# Patient Record
Sex: FEMALE | Race: WHITE | NOT HISPANIC OR LATINO | Employment: OTHER | URBAN - METROPOLITAN AREA
[De-identification: names, ages, dates, MRNs, and addresses within clinical notes are randomized per-mention and may not be internally consistent; named-entity substitution may affect disease eponyms.]

---

## 2017-02-23 ENCOUNTER — GENERIC CONVERSION - ENCOUNTER (OUTPATIENT)
Dept: OTHER | Facility: OTHER | Age: 57
End: 2017-02-23

## 2017-03-31 ENCOUNTER — ALLSCRIPTS OFFICE VISIT (OUTPATIENT)
Dept: OTHER | Facility: OTHER | Age: 57
End: 2017-03-31

## 2017-08-16 ENCOUNTER — GENERIC CONVERSION - ENCOUNTER (OUTPATIENT)
Dept: OTHER | Facility: OTHER | Age: 57
End: 2017-08-16

## 2017-08-25 ENCOUNTER — GENERIC CONVERSION - ENCOUNTER (OUTPATIENT)
Dept: OTHER | Facility: OTHER | Age: 57
End: 2017-08-25

## 2017-10-13 ENCOUNTER — TRANSCRIBE ORDERS (OUTPATIENT)
Dept: ADMINISTRATIVE | Facility: HOSPITAL | Age: 57
End: 2017-10-13

## 2017-10-13 DIAGNOSIS — H69.83 DYSFUNCTION OF BOTH EUSTACHIAN TUBES: Primary | ICD-10-CM

## 2017-11-08 ENCOUNTER — HOSPITAL ENCOUNTER (OUTPATIENT)
Dept: CT IMAGING | Facility: HOSPITAL | Age: 57
Discharge: HOME | End: 2017-11-08
Attending: SPECIALIST
Payer: COMMERCIAL

## 2017-11-08 DIAGNOSIS — H69.83 DYSFUNCTION OF BOTH EUSTACHIAN TUBES: ICD-10-CM

## 2017-11-08 PROCEDURE — 70480 CT ORBIT/EAR/FOSSA W/O DYE: CPT

## 2017-11-20 ENCOUNTER — GENERIC CONVERSION - ENCOUNTER (OUTPATIENT)
Dept: OTHER | Facility: OTHER | Age: 57
End: 2017-11-20

## 2017-12-14 DIAGNOSIS — Z12.31 ENCOUNTER FOR SCREENING MAMMOGRAM FOR MALIGNANT NEOPLASM OF BREAST: ICD-10-CM

## 2017-12-15 ENCOUNTER — ALLSCRIPTS OFFICE VISIT (OUTPATIENT)
Dept: OTHER | Facility: OTHER | Age: 57
End: 2017-12-15

## 2017-12-16 NOTE — PROGRESS NOTES
Assessment  1  Encounter for routine gynecological examination () (Z01 419)    Plan  Encounter for screening mammogram for breast cancer    · MAMMO SCREENING BILATERAL W 3D & CAD; Status:Hold For - Scheduling; Requestedfor:34Hgo5924;    Perform:[facility]; Due:55Msk2515; Ordered;For:Encounter for screening mammogram for breast cancer; Ordered By:Karissa Crowley;  Insomnia    · Zolpidem Tartrate 5 MG Oral Tablet (Ambien); TAKE 1 TABLET AT  BEDTIME ASNEEDED FOR INSOMNIA   Rx By: Hattie Blackman; Dispense: 30 Days ; #:30 Tablet; Refill: 1;For: Insomnia; ANTONIO = N; Print Rx    Discussion/Summary  healthy adult female Currently, she eats an adequate diet  the risks and benefits of cervical cancer screening were discussed cervical cancer screening is current HPV and Pap Co-testing Done Today Breast cancer screening: monthly self breast exam was advised, mammogram is current, mammogram has been ordered and mammogram is needed every year  Colorectal cancer screening: colonoscopy is needed every ten years  Osteoporosis screening: bone mineral density testing is not indicated  Patient discussion: discussed with the patient  The patient has the current Goals: Well woman  The patent has the current Barriers: None  Patient is able to Self-Care  Chief Complaint  pt presents for a yearly exam      History of Present Illness  HPI: 58 yo  for annual exam; doing well; Menopausal - LMP 2013Not using Beth Rodney as much - using vallerian root, which is helping   GYN HM, Adult Female  [facility]: The patient is being seen for a gynecology evaluation  The last health maintenance visit was 2 year(s) ago  General Health: The patient's health since the last visit is described as good  Lifestyle:  She does not use tobacco -- She denies alcohol use  -- She denies drug use  Reproductive health: the patient is postmenopausal--   she reports no menstrual problems  -- she is sexually active  -- pregnancy history: G 3P 2  Screening: Cervical cancer screening includes a pap smear performed -- and-- human papilloma virus screening performed   Breast cancer screening includes a mammogram performed   Review of Systems  no pelvic pain,-- no pelvic pressure,-- no vaginal pain,-- no vaginal discharge,-- no vaginal itching,-- no vaginal lump or mass,-- no vaginal odor,-- no vulvar pain,-- no vulvar itching,-- no vulvar lump or mass,-- no postmenopausal bleeding,-- no dysuria-- and-- no urinary loss of control  Constitutional: No fever, no chills, feels well, no tiredness, no recent weight gain or loss  ENT: no ear ache, no loss of hearing, no nosebleeds or nasal discharge, no sore throat or hoarseness  Cardiovascular: no complaints of slow or fast heart rate, no chest pain, no palpitations, no leg claudication or lower extremity edema  Respiratory: no complaints of shortness of breath, no wheezing, no dyspnea on exertion, no orthopnea or PND  Breasts: no complaints of breast pain, breast lump or nipple discharge  Gastrointestinal: no complaints of abdominal pain, no constipation, no nausea or diarrhea, no vomiting, no bloody stools  Genitourinary: no complaints of dysuria, no incontinence, no pelvic pain, no dysmenorrhea, no vaginal discharge or abnormal vaginal bleeding  Musculoskeletal: no complaints of arthralgia, no myalgia, no joint swelling or stiffness, no limb pain or swelling  Integumentary: no complaints of skin rash or lesion, no itching or dry skin, no skin wounds  Neurological: no complaints of headache, no confusion, no numbness or tingling, no dizziness or fainting  ROS reviewed  OB History  Pregnancy History (Brief):  Prior pregnancies: : 3  Para: 2 (full-term)       Active Problems  1  Acute sinusitis (461 9) (J01 90)   2  Encounter for screening mammogram for breast cancer (V76 12) (Z12 31)   3   Insomnia (780 52) (G47 00)    Past Medical History   · History of Acute URI (465 9) (J06 9)   · History of Axillary swelling (729 81) (M79 89)   · History of Blood in stool (578 1) (K92 1)   · History of Cervical cancer screening (V76 2) (Z12 4)   · History of Chest pain (786 50) (R07 9)   · History of Depression screen (V79 0) (Z13 89)   · History of Dysuria (788 1) (R30 0)   · History of Encounter for screening mammogram for malignant neoplasm of breast(V76 12) (Z12 31)   · History of acute bacterial sinusitis (V12 69) (Z87 09)   · History of acute bronchitis (V12 69) (Z87 09)   · History of acute sinusitis (V12 69) (Z87 09)   · History of allergic rhinitis (V12 69) (Z87 09)   · History of bacterial conjunctivitis (V12 49) (Z86 69)   · History of esophageal reflux (V12 79) (Z87 19)   · History of Need for immunization against influenza (V04 81) (Z23)   · History of Need for prophylactic vaccination with measles-mumps-rubella (MMR) vaccine(V06 4) (Z23)   · History of PMB (postmenopausal bleeding) (627 1) (N95 0)   · History of Recurrent cold sores (054 9) (B00 1)   · History of Routine Gynecological Exam With Cervical Pap Smear (V72 31)   · History of Screening for cardiovascular condition (V81 2) (Z13 6)   · History of Screening for deficiency anemia (V78 1) (Z13 0)   · History of Screening for diabetes mellitus (V77 1) (Z13 1)   · History of Screening for thyroid disorder (V77 0) (Z13 29)   · History of Sensation of lump in throat (784 99) (R22 1)   · History of Sore throat (462) (J02 9)   · History of Urgency of urination (788 63) (R39 15)   · History of Urinary frequency (788 41) (R35 0)   · Urinary tract infection (599 0) (N39 0)    The active problems and past medical history were reviewed and updated today  Surgical History   · History of Colonoscopy (Fiberoptic) Screening   · History of Complete Colonoscopy   · History of Lymphadenectomy   · History of Tubal Ligation    The surgical history was reviewed and updated today         Family History  Mother    · Family history of Diabetes · Family history of Hypertension    The family history was reviewed and updated today  Social History   · Being A Social Drinker   · Never A Smoker  The social history was reviewed and updated today  The social history was reviewed and is unchanged  Current Meds   1  Azelastine HCl - 0 1 % Nasal Solution; INSERT 2 SQUIRTS IN EACH NOSTRIL TWICE DAILY; Therapy: 23VMD2469 to (Last Rx:13Oct2016)  Requested for: 13Oct2016 Ordered   2  Tobramycin 0 3 % Ophthalmic Solution; INSTILL 1 DROP INTO AFFECTED EYE(S) 4 TIMES DAILY; Therapy: 67Lxk0584 to (Last Rx:28Dec2016)  Requested for: 28Dec2016 Ordered   3  Zolpidem Tartrate 5 MG Oral Tablet; TAKE 1 TABLET AT  BEDTIME AS NEEDED FOR INSOMNIA; Therapy: 14Kcc2888 to (Evaluate:23Nov2016); Last Rx:55Xie9981 Ordered    Allergies  1  Sporanox CAPS   2  Sulfa Drugs    Vitals   Recorded: 20CDY6308 21:58IY   Systolic 501   Diastolic 60   Height 5 ft 6 in   Weight 153 lb    BMI Calculated 24 7   BSA Calculated 1 78     Physical Exam   Constitutional  General appearance: No acute distress, well appearing and well nourished  Neck  Neck: Normal, supple, trachea midline, no masses  Thyroid: Normal, no thyromegaly  Pulmonary  Respiratory effort: No increased work of breathing or signs of respiratory distress  Auscultation of lungs: Clear to auscultation  Cardiovascular  Auscultation of heart: Normal rate and rhythm, normal S1 and S2, no murmurs  Peripheral vascular exam: Normal pulses Throughout  Genitourinary  External genitalia: Normal and no lesions appreciated  Vagina: Normal, no lesions or dryness appreciated  Urethra: Normal    Urethral meatus: Normal    Bladder: Normal, soft, non-tender and no prolapse or masses appreciated  Cervix: Normal, no palpable masses  A Pap smear was performed  Uterus: Normal, non-tender, not enlarged, and no palpable masses  Adnexa/parametria: Normal, non-tender and no fullness or masses appreciated     Chest  Breasts: Normal and no dimpling or skin changes noted  Abdomen  Abdomen: Normal, non-tender, and no organomegaly noted  Lymphatic  Palpation of lymph nodes in neck, axillae, groin and/or other locations: No lymphadenopathy or masses noted  Skin  Skin and subcutaneous tissue: Normal skin turgor and no rashes     Palpation of skin and subcutaneous tissue: Normal    Psychiatric  Orientation to person, place, and time: Normal    Mood and affect: Normal        Signatures   Electronically signed by : MC Dennis ; Dec 15 2017  8:40AM EST                       (Author)

## 2017-12-18 ENCOUNTER — ALLSCRIPTS OFFICE VISIT (OUTPATIENT)
Dept: OTHER | Facility: OTHER | Age: 57
End: 2017-12-18

## 2017-12-19 ENCOUNTER — GENERIC CONVERSION - ENCOUNTER (OUTPATIENT)
Dept: OTHER | Facility: OTHER | Age: 57
End: 2017-12-19

## 2017-12-19 LAB
ADEQUACY: (HISTORICAL): NORMAL
CLINICIAN PROVIDIED ICD 9 OR 10 (HISTORICAL): NORMAL
COMMENT (HISTORICAL): NORMAL
DIAGNOSIS (HISTORICAL): NORMAL
PERFORMED BY (HISTORICAL): NORMAL
REFLEX (HISTORICAL): NORMAL
TEST INFORMATION (HISTORICAL): NORMAL

## 2017-12-28 ENCOUNTER — TRANSCRIBE ORDERS (OUTPATIENT)
Dept: ADMINISTRATIVE | Facility: HOSPITAL | Age: 57
End: 2017-12-28

## 2017-12-28 ENCOUNTER — GENERIC CONVERSION - ENCOUNTER (OUTPATIENT)
Dept: FAMILY MEDICINE CLINIC | Facility: CLINIC | Age: 57
End: 2017-12-28

## 2017-12-28 DIAGNOSIS — H69.83 DYSFUNCTION OF BOTH EUSTACHIAN TUBES: Primary | ICD-10-CM

## 2017-12-28 DIAGNOSIS — H70.93 MASTOIDITIS OF BOTH SIDES: ICD-10-CM

## 2018-01-10 NOTE — RESULT NOTES
Verified Results  (1) URINE CULTURE 22QML1959 03:10PM [employer] Order Number: [id number]     Test Name Result Flag Reference   CLINICAL REPORT (Report)     Test:        Urine culture  Specimen Type:   Urine  Specimen Date:   11/11/2016 3:10 PM  Result Date:    11/14/2016 1:49 PM  Result Status:   Final result  Resulting Lab:   [place] [address]            Tel: [phone]      CULTURE                                       ------------------                                   >100,000 cfu/ml Staphylococcus saprophyticus     *** Routine testing of urine isolates of S  saprophyticus is not recommended      per CLSI guidelines  S  saprophyticus acute, uncomplicated urinary tract infections respond to      concentrations achieved in urine of antimicrobial agents commonly used to      treat them  Antimicrobials commonly used to treat acute, uncomplicated UTI's are      nitrofurantoin, fluoroquinolones or trimethoprim with or without      sulfamethoxazole   ***

## 2018-01-12 NOTE — PROGRESS NOTES
Assessment    1  Encounter for preventive health examination (V70 0) (Z00 00)   2  Esophageal reflux (530 81) (K21 9)   3  Insomnia (780 52) (G47 00)   4  Axillary swelling (729 81) (M79 89)    Plan  Depression screen    · *VB-Depression Screening; Status:Complete;   Done: 19PMJ5915  Insomnia    · Zolpidem Tartrate 5 MG Oral Tablet (Ambien); TAKE 1 TABLET AT  BEDTIME AS  NEEDED FOR INSOMNIA   · Zolpidem Tartrate ER 6 25 MG Oral Tablet Extended Release; TAKE 1 TABLET AT  BEDTIME  PMH: Cervical cancer screening    · (1) THIN PREP PAP WITH IMAGING ; every 3 years; Last 41VLP6435; Next 64IBT0317;  Status:Active    Discussion/Summary  health maintenance visit Currently, she eats a healthy diet and has an adequate exercise regimen  cervical cancer screening is current pt follows with Dr Luis F Quiles at [address] [place] [address] OB/GYN Breast cancer screening: mammogram is current and due Jan 2017  Colorectal cancer screening: colorectal cancer screening is current and last done feb 28, 2009  will be due 2019  Screening lab work includes I reviewed patient's fasting blood work results from July 2016  Total cholesterol 193, HDL 69,   Fasting sugar 93 with normal kidney and liver function  Normal electrolytes  TSH 2  Normal CBC  I reassured patient no further treatment is necessary and these are excellent numbers  We'll repeat in one year  The Patient is up-to-date with her Adacel, last given July 2016 at work  She will be getting her influenza vaccination through her job  She was advised to be evaluated by an ophthalmologist and a dentist  Advice and education were given regarding nutrition, aerobic exercise, weight bearing exercise, vitamin D supplements, reproductive health and cardiovascular risk reduction  Patient discussion: discussed with the patient  Patient has a history of surgery to remove 3 enlarged lymph nodes in her left axilla dating back to 1996   I reviewed the surgery report from [facility] surgical pathology report with a diagnosis of nonspecific reactive hyperplasia in lymph node showing evidence of 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  Patient states that she was sure that the lymph nodes were benign and not related to cancer  She is up-to-date with all of her cancer screenings  She states that on occasion she will get some burning and aching has in her left axilla and sometimes the other lymph nodes that remained me intermittently increase in size and then go down to normal  I reassured patient that this could certainly be scar tissue and nerve damage secondary to the surgery  She states she was told by surgeon that she can return to have the others removed if necessary  I explained to patient that may be a good idea to follow-up with the surgeon to discuss having the others removed if they continue to cause her problems  Chief Complaint  patient presents for personal wellness physical, she reports being UTD with colonoscopy, pap smear and mammo who she see Dr Qamar Posada for  History of Present Illness  , Adult Female: The patient is being seen for a health maintenance evaluation  General Health: The patient's health since the last visit is described as good   Denies changes to health, no recent hospitalizations  States sometimes gets some burning in left axilla secondary to removal of 3 benign LN about 18 years ago  She has regular dental visits  She denies vision problems  Vision care includes wearing glasses and last eye exam: july 2016  She has hearing loss  (hearing loss B/L, hearing aids)  Lifestyle:  She consumes a diverse and healthy diet  She does not have any weight concerns  She exercises regularly  Exercise includes walking  She does not use tobacco  She consumes alcohol  (social ETOH)   She denies drug use  Reproductive health: the patient is postmenopausal   she reports normal menses  (LMP about 2 years ago  denies vaginal bleed since Postmenopause)    she is sexually active  She is monogamous with a male partner  pregnancy history: G 3P 2, 0, 1, 2(miscarriages: 1 )  Screening: Cervical cancer screening includes a pap smear performed nov 2015  Breast cancer screening includes a mammogram performed jan 2016  Colorectal cancer screening includes a colonoscopy performed approx 2009  Review of Systems    Constitutional: No fever, no chills, feels well, no tiredness, no recent weight gain or weight loss  Eyes: No complaints of eye pain, no red eyes, no eyesight problems, no discharge, no dry eyes, no itching of eyes  ENT: no complaints of earache, no loss of hearing, no nose bleeds, no nasal discharge, no sore throat, no hoarseness  Cardiovascular: No complaints of slow heart rate, no fast heart rate, no chest pain, no palpitations, no leg claudication, no lower extremity edema  Respiratory: No complaints of shortness of breath, no wheezing, no cough, no SOB on exertion, no orthopnea, no PND  Gastrointestinal: No complaints of abdominal pain, no constipation, no nausea or vomiting, no diarrhea, no bloody stools  Genitourinary: No complaints of dysuria, no incontinence, no pelvic pain, no dysmenorrhea, no vaginal discharge or bleeding  Musculoskeletal: No complaints of arthralgias, no myalgias, no joint swelling or stiffness, no limb pain or swelling  Integumentary: No complaints of skin rash or lesions, no itching, no skin wounds, no breast pain or lump  Neurological: No complaints of headache, no confusion, no convulsions, no numbness, no dizziness or fainting, no tingling, no limb weakness, no difficulty walking  Psychiatric: Not suicidal, no sleep disturbance, no anxiety or depression, no change in personality, no emotional problems  Endocrine: No complaints of proptosis, no hot flashes, no muscle weakness, no deepening of the voice, no feelings of weakness     Hematologic/Lymphatic: No complaints of swollen glands, no swollen glands in the neck, does not bleed easily, does not bruise easily  Over the past 2 weeks, how often have you been bothered by the following problems? 1 ) Little interest or pleasure in doing things? Not at all    2 ) Feeling down, depressed or hopeless? Not at all    3 ) Trouble falling asleep or sleeping too much? Several days  4 ) Feeling tired or having little energy? Not at all    5 ) Poor appetite or overeating? Not at all    6 ) Feeling bad about yourself, or that you are a failure, or have let yourself or your family down? Not at all    7 ) Trouble concentrating on things, such as reading a newspaper or watching television? Not at all    8 ) Moving or speaking so slowly that other people could have noticed, or the opposite, moving or speaking faster than usual? Not at all  How difficult have these problems made it for you to do your work, take care of things at home, or get along with people? Not at all  Score 1      Active Problems    1  Esophageal reflux (530 81) (K21 9)   2   PMB (postmenopausal bleeding) (627 1) (N95 0)    Past Medical History    · History of Blood in stool (578 1) (K92 1)   · History of Cervical cancer screening (V76 2) (Z12 4)   · History of Chest pain (786 50) (R07 9)   · History of Encounter for routine gynecological examination (V72 31) (Z01 419)   · History of Encounter for screening mammogram for malignant neoplasm of breast  (V76 12) (Z12 31)   · History of acute bronchitis (V12 69) (Z87 09)   · History of acute sinusitis (V12 69) (Z87 09)   · History of allergic rhinitis (V12 69) (Z87 09)   · History of Need for immunization against influenza (V04 81) (Z23)   · History of Need for prophylactic vaccination with measles-mumps-rubella (MMR) vaccine  (V06 4) (Z23)   · History of Recurrent cold sores (054 9) (B00 1)   · History of Routine Gynecological Exam With Cervical Pap Smear (V72 31)   · History of Screening for cardiovascular condition (V81 2) (Z13 6)   · History of Screening for deficiency anemia (V78 1) (Z13 0)   · History of Screening for diabetes mellitus (V77 1) (Z13 1)   · History of Screening for thyroid disorder (V77 0) (Z13 29)   · History of Sensation of lump in throat (784 99) (R22 1)   · History of Sore throat (462) (J02 9)   · History of Urgency of urination (788 63) (R39 15)   · History of Urinary frequency (788 41) (R35 0)   · Urinary tract infection (599 0) (N39 0)    Surgical History    · History of Colonoscopy (Fiberoptic) Screening   · History of Complete Colonoscopy   · History of Lymphadenectomy   · History of Tubal Ligation    Family History  Mother    · Family history of Diabetes   · Family history of Hypertension    Social History    · Being A Social Drinker   · Never A Smoker    Current Meds   1  NexIUM 24HR 20 MG Oral Capsule Delayed Release; TAKE 1 CAPSULE DAILY; Therapy: 48WAN5843 to (Evaluate:23Oct2015); Last Rx:09Oct2015 Ordered   2  Zolpidem Tartrate ER 6 25 MG Oral Tablet Extended Release; TAKE 1 TABLET AT   BEDTIME; Therapy: 15RBL6325 to (Evaluate:02Noa7299); Last Rx:24Nov2015 Ordered    Allergies    1  Sporanox CAPS   2  Sulfa Drugs    Vitals   Recorded: 24TFR4129 73:85VF   Systolic 223, LUE   Diastolic 68, LUE   Heart Rate 83   Temperature 98 2 F   Pain Scale 0   LMP menopause   O2 Saturation 99   Height 5 ft 6 in   Weight 158 lb    BMI Calculated 25 5   BSA Calculated 1 81     Physical Exam    Constitutional   General appearance: No acute distress, well appearing and well nourished  appears healthy, within normal limits of ideal weight and appearance reflects stated age  vitals reviewed  Head and Face   Head and face: Normal     Eyes   Conjunctiva and lids: No swelling, erythema or discharge  Pupils and irises: Equal, round, reactive to light  EOMI, PERRLA B/L  Ears, Nose, Mouth, and Throat   External inspection of ears and nose: Normal     Otoscopic examination: Tympanic membranes translucent with normal light reflex   Canals patent without erythema  Hearing: Normal   grossly normal B/L at 10ft  Nasal mucosa, septum, and turbinates: Normal without edema or erythema  Lips, teeth, and gums: Normal, good dentition  Oropharynx: Normal with no erythema, edema, exudate or lesions  Neck   Thyroid: Normal, no thyromegaly  Pulmonary   Respiratory effort: No increased work of breathing or signs of respiratory distress  Auscultation of lungs: Clear to auscultation  Cardiovascular   Auscultation of heart: Normal rate and rhythm, normal S1 and S2, no murmurs  Carotid pulses: 2+ bilaterally  right 2+, no bruit heard over the right carotid, left 2+ and no bruit heard over the left carotid  Pedal pulses: 2+ bilaterally  Examination of extremities for edema and/or varicosities: Normal     Abdomen   Abdomen: Non-tender, no masses  The abdomen was flat  Bowel sounds were normal  The abdomen was soft and nontender  Lymphatic   Palpation of lymph nodes in neck: No lymphadenopathy  Palpation of lymph nodes in axillae: Abnormal   2 palpable LN left axilla, slightly tender, pre-existing  Palpation of lymph nodes in groin: No lymphadenopathy  Musculoskeletal   Gait and station: Normal     Digits and nails: Normal without clubbing or cyanosis  Joints, bones, and muscles: Normal     Range of motion: Normal     Stability: Normal     Muscle strength/tone: Normal     Skin   Skin and subcutaneous tissue: Normal without rashes or lesions  Neurologic   Cranial nerves: Cranial nerves II-XII intact  Reflexes: 2+ and symmetric  Deep tendon reflexes: 2+ right brachioradialis, 2+ left brachioradialis, 2+ right patella and 2+ left patella  Coordination: Normal finger to nose and heel to shin  Psychiatric   Judgment and insight: Normal     Orientation to person, place, and time: Normal     Mood and affect: Normal        Signatures   Electronically signed by : FANNIE Ahumada;  Aug 25 2016  8:41AM EST                       (Author) Electronically signed by : Larissa Soliz DO; Aug 25 2016 12:21PM EST

## 2018-01-13 ENCOUNTER — HOSPITAL ENCOUNTER (OUTPATIENT)
Dept: CT IMAGING | Facility: HOSPITAL | Age: 58
Discharge: HOME | End: 2018-01-13
Attending: SPECIALIST
Payer: COMMERCIAL

## 2018-01-13 ENCOUNTER — GENERIC CONVERSION - ENCOUNTER (OUTPATIENT)
Dept: OTHER | Facility: OTHER | Age: 58
End: 2018-01-13

## 2018-01-13 VITALS
BODY MASS INDEX: 25.78 KG/M2 | DIASTOLIC BLOOD PRESSURE: 70 MMHG | SYSTOLIC BLOOD PRESSURE: 112 MMHG | HEART RATE: 94 BPM | WEIGHT: 160.44 LBS | TEMPERATURE: 99 F | HEIGHT: 66 IN | RESPIRATION RATE: 16 BRPM | OXYGEN SATURATION: 98 %

## 2018-01-13 DIAGNOSIS — H70.93 MASTOIDITIS OF BOTH SIDES: ICD-10-CM

## 2018-01-13 DIAGNOSIS — H69.83 DYSFUNCTION OF BOTH EUSTACHIAN TUBES: ICD-10-CM

## 2018-01-13 PROCEDURE — 70480 CT ORBIT/EAR/FOSSA W/O DYE: CPT

## 2018-01-14 NOTE — RESULT NOTES
Verified Results  (1) CBC/PLT/DIFF [id number] 07:40AM Enmanuel Almaguer     Test Name Result Flag Reference   WHITE BLOOD CELL COUNT 4 5 Thousand/uL  3 8-10 8   RED BLOOD CELL COUNT 3 95 Million/uL  3 80-5 10   HEMOGLOBIN 12 6 g/dL  11 7-15 5   HEMATOCRIT 37 9 %  35 0-45 0   MCV 96 1 fL  80 0-100 0   MCH 31 9 pg  27 0-33 0   MCHC 33 2 g/dL  32 0-36 0   RDW 13 4 %  11 0-15 0   PLATELET COUNT 360 Thousand/uL  140-400   MPV 9 7 fL  7 5-11 5   ABSOLUTE NEUTROPHILS 2678 cells/uL  5028-8860   ABSOLUTE LYMPHOCYTES 1319 cells/uL  850-3900   ABSOLUTE MONOCYTES 333 cells/uL  200-950   ABSOLUTE EOSINOPHILS 144 cells/uL     ABSOLUTE BASOPHILS 27 cells/uL  0-200   NEUTROPHILS 59 5 %     LYMPHOCYTES 29 3 %     MONOCYTES 7 4 %     EOSINOPHILS 3 2 %     BASOPHILS 0 6 %       (Q) COMPREHENSIVE METABOLIC PNL W/ADJUSTED CALCIUM [id number] 07:40AM Enmanuel Almaguer     Test Name Result Flag Reference   GLUCOSE 93 mg/dL  65-99   Fasting reference interval   UREA NITROGEN (BUN) 16 mg/dL  7-25   CREATININE 0 83 mg/dL  0 50-1 05   For patients >52years of age, the reference limit  for Creatinine is approximately 13% higher for people  identified as -American  eGFR NON-AFR   AMERICAN 78 mL/min/1 73m2  > OR = 60   eGFR AFRICAN AMERICAN 90 mL/min/1 73m2  > OR = 60   BUN/CREATININE RATIO   6-02   NOT APPLICABLE (calc)   SODIUM 137 mmol/L  135-146   POTASSIUM 4 5 mmol/L  3 5-5 3   CHLORIDE 102 mmol/L     CARBON DIOXIDE 26 mmol/L  19-30   CALCIUM 9 1 mg/dL  8 6-10 4   CALCIUM (ADJUSTED FOR$ALBUMIN) 9 1 mg/dL (calc)  8 6-10 2   PROTEIN, TOTAL 7 0 g/dL  6 1-8 1   ALBUMIN 4 4 g/dL  3 6-5 1   GLOBULIN 2 6 g/dL (calc)  1 9-3 7   ALBUMIN/GLOBULIN RATIO 1 7 (calc)  1 0-2 5   BILIRUBIN, TOTAL 0 5 mg/dL  0 2-1 2   ALKALINE PHOSPHATASE 75 U/L     AST 23 U/L  10-35   ALT 24 U/L  6-29     (Q) LIPID PANEL WITH DIRECT LDL [id number] 07:40AM Enmanuel Almaguer     Test Name Result Flag Reference   CHOLESTEROL, TOTAL 193 mg/dL  125-200 HDL CHOLESTEROL 69 mg/dL  > OR = 46   TRIGLICERIDES 56 mg/dL  <141   DIRECT  mg/dL  <130   Desirable range <100 mg/dL for patients with CHD or  diabetes and <70 mg/dL for diabetic patients with  known heart disease  CHOL/HDLC RATIO 2 8 (calc)  < OR = 5 0   NON HDL CHOLESTEROL 124 mg/dL (calc)     Target for non-HDL cholesterol is 30 mg/dL higher than   LDL cholesterol target       (Q) TSH, 3RD GENERATION [id number] 07:40AM Sarath Briggs   REPORT COMMENT:  FASTING:YES     Test Name Result Flag Reference   TSH 2 07 mIU/L  0 40-4 50

## 2018-01-18 ENCOUNTER — GENERIC CONVERSION - ENCOUNTER (OUTPATIENT)
Dept: FAMILY MEDICINE CLINIC | Facility: CLINIC | Age: 58
End: 2018-01-18

## 2018-01-22 VITALS
WEIGHT: 153 LBS | DIASTOLIC BLOOD PRESSURE: 60 MMHG | BODY MASS INDEX: 24.59 KG/M2 | HEIGHT: 66 IN | SYSTOLIC BLOOD PRESSURE: 100 MMHG

## 2018-01-23 VITALS
RESPIRATION RATE: 17 BRPM | SYSTOLIC BLOOD PRESSURE: 118 MMHG | HEART RATE: 76 BPM | TEMPERATURE: 98.2 F | OXYGEN SATURATION: 98 % | BODY MASS INDEX: 25.68 KG/M2 | DIASTOLIC BLOOD PRESSURE: 76 MMHG | WEIGHT: 159.13 LBS

## 2018-01-23 NOTE — RESULT NOTES
Verified Results  ([facility]) Pap IG, rfx HPV ASCU,16/18 [id number] 12:00AM Karissa Crowley   No  of containers  Colleenzabrina Maldonadokitty 01 ThinPrep Vial     Test Name Result Flag Reference   DIAGNOSIS:      NEGATIVE FOR INTRAEPITHELIAL LESION AND MALIGNANCY  NEGATIVE FOR INTRAEPITHELIAL LESION AND MALIGNANCY  Specimen adequacy:      Satisfactory for evaluation  Endocervical and/or squamous metaplastic  cells (endocervical component) are present  Satisfactory for evaluation  Endocervical and/or squamous metaplastic  cells (endocervical component) are present  Clinician provided ICD10: C57 328     Z01 419   Performed by: Eleonora Whitehead, Cytotechnologist (ASCP)   Eleonora Whitehead Cytotechnologist (ASCP)   Comm   Note: (Report)     The Pap smear is a screening test designed to aid in the detection of  premalignant and malignant conditions of the uterine cervix  It is not a  diagnostic procedure and should not be used as the sole means of detecting  cervical cancer  Both false-positive and false-negative reports do occur  The Pap smear is a screening test designed to aid in the detection of  premalignant and malignant conditions of the uterine cervix  It is not a  diagnostic procedure and should not be used as the sole means of detecting  cervical cancer  Both false-positive and false-negative reports do occur  Test Methodology: This liquid based ThinPrep(R) pap test was screened with the  use of an image guided system  This liquid based ThinPrep(R) pap test was screened with the  use of an image guided system  Reflex      The HPV DNA reflex criteria were not met with this specimen result  therefore, no HPV testing was performed  The HPV DNA reflex criteria were not met with this specimen result  therefore, no HPV testing was performed

## 2018-02-27 RX ORDER — MONTELUKAST SODIUM 10 MG/1
1 TABLET ORAL DAILY
COMMUNITY
Start: 2016-12-27 | End: 2018-03-02

## 2018-02-27 RX ORDER — TOBRAMYCIN 3 MG/ML
1 SOLUTION/ DROPS OPHTHALMIC 4 TIMES DAILY
COMMUNITY
Start: 2016-12-28 | End: 2018-03-02

## 2018-02-27 RX ORDER — ZOLPIDEM TARTRATE 5 MG/1
1 TABLET ORAL
COMMUNITY
Start: 2016-08-25 | End: 2018-10-18 | Stop reason: SDUPTHER

## 2018-02-27 RX ORDER — METHYLPREDNISOLONE 4 MG/1
TABLET ORAL
COMMUNITY
Start: 2017-12-18 | End: 2018-03-02

## 2018-02-27 RX ORDER — AZELASTINE HYDROCHLORIDE, FLUTICASONE PROPIONATE 137; 50 UG/1; UG/1
2 SPRAY, METERED NASAL 2 TIMES DAILY
COMMUNITY
Start: 2014-12-03 | End: 2019-08-21 | Stop reason: SDUPTHER

## 2018-03-02 ENCOUNTER — OFFICE VISIT (OUTPATIENT)
Dept: FAMILY MEDICINE CLINIC | Facility: CLINIC | Age: 58
End: 2018-03-02
Payer: COMMERCIAL

## 2018-03-02 VITALS
DIASTOLIC BLOOD PRESSURE: 62 MMHG | WEIGHT: 155.8 LBS | BODY MASS INDEX: 25.04 KG/M2 | TEMPERATURE: 98.7 F | HEIGHT: 66 IN | HEART RATE: 78 BPM | RESPIRATION RATE: 16 BRPM | OXYGEN SATURATION: 97 % | SYSTOLIC BLOOD PRESSURE: 96 MMHG

## 2018-03-02 DIAGNOSIS — Z01.818 PREOP EXAMINATION: Primary | ICD-10-CM

## 2018-03-02 DIAGNOSIS — H71.91 CHOLESTEATOMA OF RIGHT EAR: ICD-10-CM

## 2018-03-02 DIAGNOSIS — G47.09 OTHER INSOMNIA: ICD-10-CM

## 2018-03-02 PROCEDURE — 99242 OFF/OP CONSLTJ NEW/EST SF 20: CPT | Performed by: PHYSICIAN ASSISTANT

## 2018-03-02 NOTE — PROGRESS NOTES
Assessment/Plan:    Cholesteatoma of right ear  Surgery scheduled 3/9/18 by Dr Aleena Zuñiga at [facility]  Insomnia  Stable on prn ambien       Diagnoses and all orders for this visit:    Preop examination    Cholesteatoma of right ear    Other insomnia    Other orders  -     Azelastine-Fluticasone 137-50 MCG/ACT SUSP; 2 Squirts into each nostril 2 (two) times a day  -     Discontinue: montelukast (SINGULAIR) 10 mg tablet; Take 1 tablet by mouth daily  -     Discontinue: Methylprednisolone (MEDROL) 4 MG TBPK; Take by mouth  -     Discontinue: tobramycin (TOBREX) 0 3 % SOLN; Apply 1 drop to eye 4 (four) times a day  -     zolpidem (AMBIEN) 5 mg tablet; Take 1 tablet by mouth     Patient is a 57-year-old female presenting today for preoperative clearance for right ear surgery to remove a right cholesteatoma  This surgery  Is planned for 3/9/18 and will be performed by Dr Gunner Burch  Patient's exam is unremarkable today and she is asymptomatic  Her vitals are normal   Her preoperative blood work was reviewed and also unremarkable  Chronic conditions only to include intermittent insomnia for which she takes p r n  Ambien  She also has been dealing with chronic sinus and eustachian tube issues and takes as needed as a last Vincenzo nasal spray  Patient is considered in general good health and there are no obvious abnormalities that would complicate surgery  At this point patient is considered low risk for surgical complication and is cleared for surgery as planned  Subjective:      Patient ID: Sandra Colindres is a 59 y o  female     57y/o female here today for preop clearance  Surgery is scheduled March 9, 2018  She is having surgery to remove right cholesteatoma  Surgeon is Dr Aleena Zuñiga associated with [facility]  Prior hx of surgeries withut post-op complication  She has hx of nausea with anesthesia      She has not had any changes to health, no recent hospitalizations or illnesses  Denies Cp, SOB, cough, hemoptysis, edema, easy bleeding or easy bruising  Postmenopausal  Pt has no complaints today  Takes ambien prn insomnia  Also dealing with chronic sinus issues and eustachian tube dysfunction  The following portions of the patient's history were reviewed and updated as appropriate: allergies, current medications, past family history, past medical history, past social history, past surgical history and problem list     Review of Systems   Constitutional: Negative  HENT:        Hearing issues, tinnitus   Eyes: Negative  Respiratory: Negative  Cardiovascular: Negative  Gastrointestinal: Negative  Endocrine: Negative  Genitourinary: Negative  Musculoskeletal: Negative  Skin: Negative  Allergic/Immunologic: Negative  Neurological: Negative  Hematological: Negative  Psychiatric/Behavioral: Negative  Objective:      BP 96/62 (BP Location: Left arm, Patient Position: Sitting, Cuff Size: Standard)   Pulse 78   Temp 98 7 °F (37 1 °C) (Tympanic)   Resp 16   Ht 5' 6" (1 676 m)   Wt 70 7 kg (155 lb 12 8 oz)   SpO2 97%   BMI 25 15 kg/m²          Physical Exam   Constitutional: She is oriented to person, place, and time  Vital signs are normal  She appears well-developed and well-nourished  She does not appear ill  No distress  HENT:   Head: Normocephalic  Wears hearing aids   Eyes: Conjunctivae, EOM and lids are normal  Pupils are equal, round, and reactive to light  Neck: Trachea normal and normal range of motion  Neck supple  Normal carotid pulses present  No thyroid mass present  Cardiovascular: Normal rate, regular rhythm, S1 normal, S2 normal and normal pulses  No murmur heard  Pulmonary/Chest: Effort normal and breath sounds normal    Abdominal: Soft  Normal appearance, normal aorta and bowel sounds are normal  There is no tenderness     Musculoskeletal:   Gait normal    Lymphadenopathy:     She has no cervical adenopathy  Neurological: She is alert and oriented to person, place, and time  No cranial nerve deficit or sensory deficit  Reflex Scores:       Brachioradialis reflexes are 1+ on the right side and 1+ on the left side  Patellar reflexes are 1+ on the right side and 1+ on the left side  Skin: Skin is intact  Psychiatric: She has a normal mood and affect   Her behavior is normal  Cognition and memory are normal        Vitals:    03/02/18 0810   BP: 96/62   BP Location: Left arm   Patient Position: Sitting   Cuff Size: Standard   Pulse: 78   Resp: 16   Temp: 98 7 °F (37 1 °C)   TempSrc: Tympanic   SpO2: 97%   Weight: 70 7 kg (155 lb 12 8 oz)   Height: 5' 6" (1 676 m)

## 2018-06-01 ENCOUNTER — TELEPHONE (OUTPATIENT)
Dept: FAMILY MEDICINE CLINIC | Facility: CLINIC | Age: 58
End: 2018-06-01

## 2018-06-03 DIAGNOSIS — Z23 NEED FOR VACCINATION: Primary | ICD-10-CM

## 2018-06-03 NOTE — TELEPHONE ENCOUNTER
Order sent to [facility]   It is a 2 vaccine series, given 6 mos apart

## 2018-06-22 ENCOUNTER — TELEPHONE (OUTPATIENT)
Dept: FAMILY MEDICINE CLINIC | Facility: CLINIC | Age: 58
End: 2018-06-22

## 2018-06-23 DIAGNOSIS — Z23 NEED FOR VACCINATION: Primary | ICD-10-CM

## 2018-06-25 ENCOUNTER — TRANSCRIBE ORDERS (OUTPATIENT)
Dept: ADMINISTRATIVE | Facility: HOSPITAL | Age: 58
End: 2018-06-25

## 2018-06-25 DIAGNOSIS — H70.93 MASTOIDITIS OF BOTH SIDES: Primary | ICD-10-CM

## 2018-08-07 ENCOUNTER — HOSPITAL ENCOUNTER (OUTPATIENT)
Dept: CT IMAGING | Facility: HOSPITAL | Age: 58
Discharge: HOME | End: 2018-08-07
Attending: SPECIALIST
Payer: COMMERCIAL

## 2018-08-07 DIAGNOSIS — H70.93 MASTOIDITIS OF BOTH SIDES: ICD-10-CM

## 2018-08-07 PROCEDURE — 70480 CT ORBIT/EAR/FOSSA W/O DYE: CPT

## 2018-08-09 ENCOUNTER — OFFICE VISIT (OUTPATIENT)
Dept: FAMILY MEDICINE CLINIC | Facility: CLINIC | Age: 58
End: 2018-08-09
Payer: COMMERCIAL

## 2018-08-09 VITALS
WEIGHT: 161.06 LBS | OXYGEN SATURATION: 98 % | HEIGHT: 66 IN | HEART RATE: 81 BPM | TEMPERATURE: 98.3 F | BODY MASS INDEX: 25.89 KG/M2 | SYSTOLIC BLOOD PRESSURE: 110 MMHG | DIASTOLIC BLOOD PRESSURE: 60 MMHG | RESPIRATION RATE: 17 BRPM

## 2018-08-09 DIAGNOSIS — J01.90 ACUTE NON-RECURRENT SINUSITIS, UNSPECIFIED LOCATION: Primary | ICD-10-CM

## 2018-08-09 PROCEDURE — 99213 OFFICE O/P EST LOW 20 MIN: CPT | Performed by: PHYSICIAN ASSISTANT

## 2018-08-09 RX ORDER — VALACYCLOVIR HYDROCHLORIDE 1 G/1
1 TABLET, FILM COATED ORAL AS NEEDED
COMMUNITY

## 2018-08-09 RX ORDER — AMOXICILLIN AND CLAVULANATE POTASSIUM 875; 125 MG/1; MG/1
1 TABLET, FILM COATED ORAL EVERY 12 HOURS SCHEDULED
Qty: 20 TABLET | Refills: 0 | Status: SHIPPED | OUTPATIENT
Start: 2018-08-09 | End: 2018-08-19

## 2018-08-09 RX ORDER — EPINEPHRINE 0.3 MG/.3ML
INJECTION SUBCUTANEOUS AS NEEDED
COMMUNITY
End: 2020-12-15 | Stop reason: SDUPTHER

## 2018-08-09 RX ORDER — AZELASTINE HYDROCHLORIDE 0.5 MG/ML
SOLUTION/ DROPS OPHTHALMIC DAILY
COMMUNITY
End: 2019-08-21 | Stop reason: SDUPTHER

## 2018-08-09 NOTE — PROGRESS NOTES
Assessment/Plan:      Diagnoses and all orders for this visit:    Acute non-recurrent sinusitis, unspecified location  -     amoxicillin-clavulanate (AUGMENTIN) 875-125 mg per tablet; Take 1 tablet by mouth every 12 (twelve) hours for 10 days    Other orders  -     azelastine (OPTIVAR) 0 05 % ophthalmic solution; Administer to both eyes daily  -     Esomeprazole Magnesium (NEXIUM PO); Take 1 tablet by mouth daily as needed  -     Calcium Carb-Cholecalciferol (CALCIUM 500 +D PO); Take 1 tablet by mouth daily  -     Tuberculin-Allergy Syringes (B-D ALLERGY SYRINGE 1CC/28G) 28G X 1/2" 1 ML MISC; every 14 (fourteen) days        68-year-old female presenting today for upper respiratory symptoms consistent with sinusitis  She will complete a 10 day course of Augmentin  She will continue antihistamine decongestant combination in addition to her as a last seen nasal spray  She can use ibuprofen, Motrin, Aleve or Advil for headache  Rest, fluids, warm steam all advised  She is to call or follow-up should symptoms persist or worsen despite treatment  If she continues with some sinus pressure and headache I may consider adding a steroid taper  Chief Complaint   Patient presents with    Cold Like Symptoms     cough, congestion, PND, HA,ST and sinus pressure x 9 days  Has taken Allegra D with little relief       Subjective:     Patient ID: Wilian Courser is a 59 y o  female  55y/o female here today for ongoing sinus sxs past 8-9 days  She reports PND and cough as well as headache and sinus pressure  Also green mucous she is blowing out  No fevers  She tried allegra D and advil  Using azelastine nasal spray  Review of Systems   Constitutional: Positive for fatigue  Negative for fever  HENT: Positive for congestion, ear pain and sinus pressure  Negative for sore throat  Respiratory: Positive for cough  Cardiovascular: Negative  Neurological: Negative  Psychiatric/Behavioral: Negative            The following portions of the patient's history were reviewed and updated as appropriate: allergies, current medications, past family history, past medical history, past social history, past surgical history and problem list       Objective:     Physical Exam   Constitutional: She is oriented to person, place, and time  She appears well-developed and well-nourished  No distress  HENT:   Head: Normocephalic  Left Ear: Tympanic membrane and ear canal normal    Nose: Mucosal edema and rhinorrhea present  Tube right TM  PND   Neck: Neck supple  Cardiovascular: Normal rate, regular rhythm and normal heart sounds  Pulmonary/Chest: Effort normal and breath sounds normal    Lymphadenopathy:     She has no cervical adenopathy  Neurological: She is alert and oriented to person, place, and time  Psychiatric: She has a normal mood and affect  Vitals reviewed        Vitals:    08/09/18 1119   BP: 110/60   BP Location: Left arm   Patient Position: Sitting   Cuff Size: Adult   Pulse: 81   Resp: 17   Temp: 98 3 °F (36 8 °C)   TempSrc: Tympanic   SpO2: 98%   Weight: 73 1 kg (161 lb 1 oz)   Height: 5' 6 14" (1 68 m)

## 2018-09-25 ENCOUNTER — TELEPHONE (OUTPATIENT)
Dept: FAMILY MEDICINE CLINIC | Facility: CLINIC | Age: 58
End: 2018-09-25

## 2018-09-25 NOTE — TELEPHONE ENCOUNTER
COURTNEY Melgoza from [facility] called to let you know the pt was admitted to [facility] today  He can not disclose the reason   If you have any questions you can call [phone]

## 2018-09-25 NOTE — TELEPHONE ENCOUNTER
I tried calling the number you gave and spoke with one of the staff at neurosurg who put me through to resident  Resident said she is not familiar with that patient  I was not able to get any info about what is going on  I asked to speak to freda at neuro surg number he gave and they said they do not have a freda on team there

## 2018-09-25 NOTE — TELEPHONE ENCOUNTER
Called [phone] and spoke with Anna Valdes, she stated she just talked with someone and explained she didn't know who Bib Ramos was  Informed her I was calling back for the Pt's Dr to try and get more information for her, the pt's dr does have questions and would like to talk with someone  Anna Valdes transferred me to another number and I spoke with Amy Reyes said she is not real familiar with this pat as she is not assigned to her  She was able to confirm the pt was admitted today for a surgery and is in Neurosurgery ICU  [place] stated the procedure was performed this morning  Asked her for a direct name and number for the Dr to reach someone for an update and to answer questions  Informed her we weren't seeing much in Care everywhere and the pt has signed off for us to see this information  Amy took our phone number and Lady's name, she stated she is going to see what she can find out and get someone assigned to the pt to call Britany Earl and discuss further  I did let her know Britany Earl would like to be pulled out of the room when someone calls, it is important

## 2018-10-02 ENCOUNTER — TRANSITIONAL CARE MANAGEMENT (OUTPATIENT)
Dept: FAMILY MEDICINE CLINIC | Facility: CLINIC | Age: 58
End: 2018-10-02

## 2018-10-18 ENCOUNTER — ANNUAL EXAM (OUTPATIENT)
Dept: OBGYN CLINIC | Facility: MEDICAL CENTER | Age: 58
End: 2018-10-18
Payer: COMMERCIAL

## 2018-10-18 VITALS — DIASTOLIC BLOOD PRESSURE: 72 MMHG | WEIGHT: 160 LBS | BODY MASS INDEX: 25.71 KG/M2 | SYSTOLIC BLOOD PRESSURE: 120 MMHG

## 2018-10-18 DIAGNOSIS — R10.32 LLQ PAIN: ICD-10-CM

## 2018-10-18 DIAGNOSIS — Z01.419 ENCOUNTER FOR WELL WOMAN EXAM WITH ROUTINE GYNECOLOGICAL EXAM: Primary | ICD-10-CM

## 2018-10-18 DIAGNOSIS — G47.00 INSOMNIA, UNSPECIFIED TYPE: ICD-10-CM

## 2018-10-18 DIAGNOSIS — Z12.39 SCREENING FOR MALIGNANT NEOPLASM OF BREAST: ICD-10-CM

## 2018-10-18 DIAGNOSIS — Z12.4 ENCOUNTER FOR PAPANICOLAOU SMEAR FOR CERVICAL CANCER SCREENING: ICD-10-CM

## 2018-10-18 PROCEDURE — 99396 PREV VISIT EST AGE 40-64: CPT | Performed by: OBSTETRICS & GYNECOLOGY

## 2018-10-18 RX ORDER — ZOLPIDEM TARTRATE 5 MG/1
5 TABLET ORAL
Qty: 30 TABLET | Refills: 5 | Status: SHIPPED | OUTPATIENT
Start: 2018-10-18 | End: 2019-10-22 | Stop reason: SDUPTHER

## 2018-10-18 NOTE — PROGRESS NOTES
ASSESSMENT & PLAN: Zurdo Pierce is a [address] y o  No obstetric history on file  with normal gynecologic exam     1   Routine well woman exam done today  2  Pap and HPV:  The patient's last pap and hpv was 2015  It was normal     Pap and cotesting was done today  Current ASCCP Guidelines reviewed  Per patient's request  3  Mammogram ordered  4  Colonoscopy - scheduled December, 2018  5  The following were reviewed in today's visit: breast self exam and mammography screening ordered  6  LLQ pain, US ordered     CC:  Annual Gynecologic Examination    HPI: Zurdo Pierce is a [address] y o  No obstetric history on file  who presents for annual gynecologic examination  She has the following concerns:  Complains of occasional LLQ pain; intermittent, nonradiating; not assocaited with bowel or bladder habits; no bleeding/discharge/odor    Health Maintenance:    She wears her seatbelt routinely  She does perform regular monthly self breast exams  She feels safe at home  Pap: 2015  Last mammogram:  2018  Last colonoscopy: due this ear    Past Medical History:   Diagnosis Date    Allergic rhinitis     LAST ASSESSED [id number]    Chest pain     LAST ASSESSED [id number]    Dysuria     LAST ASSESSED [id number]    Esophageal reflux     LAST ASSESSED [id number]    PMB (postmenopausal bleeding)     RESOLVED [id number]    Recurrent cold sores     LAST ASSESSED [id number]       Past Surgical History:   Procedure Laterality Date    CHOLESTEATOMA EXCISION  03/2018    COLONOSCOPY  2009    COLONOSCOPY      LYMPHADENECTOMY      TUBAL LIGATION         Past OB/Gyn History:  OB History     No data available        Pt does not have menstrual issues      History of sexually transmitted infection: No   History of abnormal pap smears: No      Family History   Problem Relation Age of Onset    Diabetes Mother     Hypertension Mother     Stroke Mother     Arthritis Father     Hearing loss Father     Alcohol abuse Neg Hx     Substance Abuse Neg Hx     Mental illness Neg Hx     Depression Neg Hx        Social History:  Social History     Social History    Marital status: /Civil Union     Spouse name: N/A    Number of children: N/A    Years of education: N/A     Occupational History    Not on file  Social History Main Topics    Smoking status: Never Smoker    Smokeless tobacco: Never Used    Alcohol use Yes      Comment: SOCIAL     Drug use: No    Sexual activity: Yes     Partners: Male     Other Topics Concern    Not on file     Social History Narrative    No narrative on file       Allergies   Allergen Reactions    Itraconazole Rash    Sulfa Antibiotics Swelling       Current Outpatient Prescriptions:     azelastine (OPTIVAR) 0 05 % ophthalmic solution, Administer to both eyes daily, Disp: , Rfl:     Azelastine-Fluticasone 137-50 MCG/ACT SUSP, 2 Squirts into each nostril 2 (two) times a day, Disp: , Rfl:     Calcium Carb-Cholecalciferol (CALCIUM 500 +D PO), Take 1 tablet by mouth daily, Disp: , Rfl:     Ergocalciferol (VITAMIN D2 PO), Take 1 tablet by mouth daily  , Disp: , Rfl:     Esomeprazole Magnesium (NEXIUM PO), Take 1 tablet by mouth daily as needed, Disp: , Rfl:     Tuberculin-Allergy Syringes (B-D ALLERGY SYRINGE 1CC/28G) 28G X 1/2" 1 ML MISC, every 14 (fourteen) days, Disp: , Rfl:     valACYclovir (VALTREX) 1,000 mg tablet, Take 1 tablet by mouth daily, Disp: , Rfl:     zolpidem (AMBIEN) 5 mg tablet, Take 1 tablet (5 mg total) by mouth daily at bedtime as needed for sleep, Disp: 30 tablet, Rfl: 5    EPINEPHrine (EPIPEN 2-HERBERT) 0 3 mg/0 3 mL SOAJ, Inject into a muscle as needed, Disp: , Rfl:       Review of Systems  Constitutional :no fever, feels well, no tiredness, no recent weight gain or loss  ENT: no ear ache, no loss of hearing, no nosebleeds or nasal discharge, no sore throat or hoarseness    Cardiovascular: no complaints of slow or fast heart beat, no chest pain, no palpitations, no leg claudication or lower extremity edema  Respiratory: no complaints of shortness of shortness of breath, no WADE  Breasts:no complaints of breast pain, breast lump, or nipple discharge  Gastrointestinal: no complaints of abdominal pain, constipation, nausea, vomiting, or diarrhea or bloody stools  Genitourinary : no complaints of dysuria, incontinence, pelvic pain, no dysmenorrhea, vaginal discharge or abnormal vaginal bleeding and as noted in HPI  Musculoskeletal: no complaints of arthralgia, no myalgia, no joint swelling or stiffness, no limb pain or swelling  Integumentary: no complaints of skin rash or lesion, itching or dry skin  Neurological: no complaints of headache, no confusion, no numbness or tingling, no dizziness or fainting    Objective      /72   Wt 72 6 kg (160 lb)   BMI 25 71 kg/m²     General:   appears stated age, cooperative, alert normal mood and affect   Neck: normal, supple,trachea midline, no masses   Heart: regular rate and rhythm, S1, S2 normal, no murmur, click, rub or gallop   Lungs: clear to auscultation bilaterally   Breasts: normal appearance, no masses or tenderness, Inspection negative, No nipple retraction or dimpling, No nipple discharge or bleeding, No axillary or supraclavicular adenopathy, Normal to palpation without dominant masses   Abdomen: soft, non-tender, without masses or organomegaly   Vulva: normal female genitalia, Bartholin's, Urethra, [place] normal, no lesions, normal female hair distribution   Vagina: normal vagina, no discharge, exudate, lesion, or erythema   Urethra: normal   Cervix: Normal, no discharge  PAP done  Uterus: normal size, contour, position, consistency, mobility, non-tender   Adnexa: normal adnexa   Lymphatic palpation of lymph nodes in neck, axilla, groin and/or other locations: no lymphadenopathy or masses noted   Skin normal skin turgor and no rashes     Psychiatric orientation to person, place, and time: normal  mood and affect: normal

## 2018-10-19 ENCOUNTER — HOSPITAL ENCOUNTER (OUTPATIENT)
Dept: ULTRASOUND IMAGING | Facility: MEDICAL CENTER | Age: 58
Discharge: HOME | End: 2018-10-19
Payer: COMMERCIAL

## 2018-10-19 DIAGNOSIS — R10.32 LLQ PAIN: ICD-10-CM

## 2018-10-19 PROCEDURE — 76856 US EXAM PELVIC COMPLETE: CPT

## 2018-10-19 PROCEDURE — 76830 TRANSVAGINAL US NON-OB: CPT

## 2018-10-23 LAB
CYTOLOGIST CVX/VAG CYTO: NORMAL
DX ICD CODE: NORMAL
Lab: NORMAL
OTHER STN SPEC: NORMAL
OTHER STN SPEC: NORMAL
PATH REPORT.FINAL DX SPEC: NORMAL
SL AMB NOTE:: NORMAL
SL AMB SPECIMEN ADEQUACY: NORMAL
SL AMB TEST METHODOLOGY: NORMAL

## 2018-10-25 ENCOUNTER — CLINICAL SUPPORT (OUTPATIENT)
Dept: FAMILY MEDICINE CLINIC | Facility: CLINIC | Age: 58
End: 2018-10-25
Payer: COMMERCIAL

## 2018-10-25 DIAGNOSIS — Z23 NEED FOR SHINGLES VACCINE: Primary | ICD-10-CM

## 2018-10-25 PROCEDURE — 90750 HZV VACC RECOMBINANT IM: CPT | Performed by: PHYSICIAN ASSISTANT

## 2018-10-25 PROCEDURE — 90471 IMMUNIZATION ADMIN: CPT | Performed by: PHYSICIAN ASSISTANT

## 2018-12-06 ENCOUNTER — ANESTHESIA EVENT (OUTPATIENT)
Dept: GASTROENTEROLOGY | Facility: HOSPITAL | Age: 58
End: 2018-12-06
Payer: COMMERCIAL

## 2018-12-07 ENCOUNTER — HOSPITAL ENCOUNTER (OUTPATIENT)
Facility: HOSPITAL | Age: 58
Setting detail: OUTPATIENT SURGERY
Discharge: HOME | End: 2018-12-07
Attending: INTERNAL MEDICINE | Admitting: INTERNAL MEDICINE
Payer: COMMERCIAL

## 2018-12-07 ENCOUNTER — ANESTHESIA (OUTPATIENT)
Dept: GASTROENTEROLOGY | Facility: HOSPITAL | Age: 58
End: 2018-12-07
Payer: COMMERCIAL

## 2018-12-07 VITALS
BODY MASS INDEX: 25.71 KG/M2 | SYSTOLIC BLOOD PRESSURE: 101 MMHG | HEIGHT: 66 IN | RESPIRATION RATE: 16 BRPM | WEIGHT: 160 LBS | OXYGEN SATURATION: 100 % | HEART RATE: 73 BPM | DIASTOLIC BLOOD PRESSURE: 62 MMHG | TEMPERATURE: 98.6 F

## 2018-12-07 RX ORDER — SODIUM CHLORIDE 9 MG/ML
125 INJECTION, SOLUTION INTRAVENOUS CONTINUOUS
Status: DISCONTINUED | OUTPATIENT
Start: 2018-12-07 | End: 2018-12-07 | Stop reason: HOSPADM

## 2018-12-07 RX ORDER — DIPHENOXYLATE HYDROCHLORIDE AND ATROPINE SULFATE 2.5; .025 MG/1; MG/1
1 TABLET ORAL DAILY
COMMUNITY

## 2018-12-07 RX ORDER — PROPOFOL 10 MG/ML
INJECTION, EMULSION INTRAVENOUS AS NEEDED
Status: DISCONTINUED | OUTPATIENT
Start: 2018-12-07 | End: 2018-12-07 | Stop reason: SURG

## 2018-12-07 RX ORDER — ONDANSETRON 2 MG/ML
4 INJECTION INTRAMUSCULAR; INTRAVENOUS ONCE
Status: COMPLETED | OUTPATIENT
Start: 2018-12-07 | End: 2018-12-07

## 2018-12-07 RX ADMIN — PROPOFOL 100 MG: 10 INJECTION, EMULSION INTRAVENOUS at 07:36

## 2018-12-07 RX ADMIN — SODIUM CHLORIDE 125 ML/HR: 0.9 INJECTION, SOLUTION INTRAVENOUS at 07:10

## 2018-12-07 RX ADMIN — PROPOFOL 100 MG: 10 INJECTION, EMULSION INTRAVENOUS at 07:32

## 2018-12-07 RX ADMIN — ONDANSETRON 4 MG: 2 INJECTION INTRAMUSCULAR; INTRAVENOUS at 07:10

## 2018-12-07 RX ADMIN — PROPOFOL 100 MG: 10 INJECTION, EMULSION INTRAVENOUS at 07:42

## 2018-12-07 NOTE — OP NOTE
OPERATIVE REPORT  PATIENT NAME: Mingo Roa    :  1960  MRN: [id number]  Pt Location: [facility] ROOM 01    SURGERY DATE: 2018    Surgeon(s) and Role:     Mars Guerrero MD - Primary    Preop Diagnosis:  Encounter for screening for malignant neoplasm of colon [Z12 11]  Family history of colonic polyps [Z83 71]  Overweight [E66 3]    Post-Op Diagnosis Codes:     * Encounter for screening for malignant neoplasm of colon [Z12 11]     * Family history of colonic polyps [Z83 71]     * Overweight [E66 3]    Procedure(s) (LRB):  COLONOSCOPY (N/A)    Specimen(s):  * No specimens in log *    Estimated Blood Loss:   Minimal    Drains:       Anesthesia Type:   IV Sedation with Anesthesia    Operative Indications:  Encounter for screening for malignant neoplasm of colon [Z12 11]  Family history of colonic polyps [Z83 71]  Overweight [E66 3]    Operative Findings:  Normal colonoscopy      Complications:   None    Procedure and Technique: The colonoscope was introduced and passed through a moderately tortuous colon to the cecum  The preparation was very good  The cecum was reached  The colonoscope was carefully withdrawn no polyps were seen throughout a well prepped colon mucosa is normal    I have suggested colon recall in 10 years with annual stool Hemocculting at [facility] physician direction       I was present for the entire procedure    Patient Disposition:  hemodynamically stable    SIGNATURE: Burke Ledesma MD  DATE: 2018  TIME: 7:50 AM

## 2018-12-07 NOTE — ANESTHESIA PREPROCEDURE EVALUATION
Review of Systems/Medical History  Patient summary reviewed  Chart reviewed  History of anesthetic complications PONV    Cardiovascular  Negative cardio ROS    Pulmonary  Negative pulmonary ROS        GI/Hepatic  Negative GI/hepatic ROS   GERD well controlled, Bowel prep       Negative  ROS        Endo/Other  Negative endo/other ROS      GYN  Negative gynecology ROS          Hematology  Negative hematology ROS      Musculoskeletal    Arthritis     Neurology  Negative neurology ROS      Psychology   Negative psychology ROS              Physical Exam    Airway    Mallampati score: I  TM Distance: >3 FB  Neck ROM: full     Dental   No notable dental hx     Cardiovascular  Comment: Negative ROS, Rhythm: regular, Rate: normal, Cardiovascular exam normal    Pulmonary  Pulmonary exam normal Breath sounds clear to auscultation,     Other Findings        Anesthesia Plan  ASA Score- 2     Anesthesia Type- IV sedation with anesthesia with ASA Monitors  Additional Monitors:   Airway Plan:     Comment: Zofran 4 mg IV ordered preop  Plan Factors-Patient not instructed to abstain from smoking on day of procedure  Patient did not smoke on day of surgery  Induction- intravenous  Postoperative Plan-     Informed Consent- Anesthetic plan and risks discussed with patient and spouse

## 2018-12-07 NOTE — DISCHARGE INSTRUCTIONS
Please call [phone] with any problems  Your examination today was normal   I would suggest repeat examination in 10 years unless her family history or personal history of symptoms changes  I also would suggest getting her stools checked for blood yearly starting next year you family physician's direction  Colonoscopy   WHAT YOU NEED TO KNOW:   A colonoscopy is a procedure to examine the inside of your colon (intestine) with a scope  Polyps or tissue growths may have been removed during your colonoscopy  It is normal to feel bloated and to have some abdominal discomfort  You should be passing gas  If you have hemorrhoids or you had polyps removed, you may have a small amount of bleeding  DISCHARGE INSTRUCTIONS:   Seek care immediately if:   · You have a large amount of bright red blood in your bowel movements  · Your abdomen is hard and firm and you have severe pain  · You have sudden trouble breathing  Contact your healthcare provider if:   · You develop a rash or hives  · You have a fever within 24 hours of your procedure  · You have not had a bowel movement for 3 days after your procedure  · You have questions or concerns about your condition or care  Activity:   · Do not lift, strain, or run  for 3 days after your procedure  · Rest after your procedure  You have been given medicine to relax you  Do not  drive or make important decisions until the day after your procedure  Return to your normal activity as directed  · Relieve gas and discomfort from bloating  by lying on your right side with a heating pad on your abdomen  You may need to take short walks to help the gas move out  Eat small meals until bloating is relieved  If you had polyps removed: For 7 days after your procedure:  · Do not  take aspirin  · Do not  go on long car rides  Help prevent constipation:   · Eat a variety of healthy foods    Healthy foods include fruit, vegetables, whole-grain breads, low-fat dairy products, beans, lean meat, and fish  Ask if you need to be on a special diet  Your healthcare provider may recommend that you eat high-fiber foods such as cooked beans  Fiber helps you have regular bowel movements  · Drink liquids as directed  Adults should drink between 9 and 13 eight-ounce cups of liquid every day  Ask what amount is best for you  For most people, good liquids to drink are water, juice, and milk  · Exercise as directed  Talk to your healthcare provider about the best exercise plan for you  Exercise can help prevent constipation, decrease your blood pressure and improve your health  Follow up with your healthcare provider as directed:  Write down your questions so you remember to ask them during your visits  © 2017 [employer] Information is for End User's use only and may not be sold, redistributed or otherwise used for commercial purposes  All illustrations and images included in CareNotes® are the copyrighted property of [employer]  or [employer]  The above information is an  only  It is not intended as medical advice for individual conditions or treatments  Talk to your doctor, nurse or pharmacist before following any medical regimen to see if it is safe and effective for you

## 2018-12-11 ENCOUNTER — OFFICE VISIT (OUTPATIENT)
Dept: FAMILY MEDICINE CLINIC | Facility: CLINIC | Age: 58
End: 2018-12-11
Payer: COMMERCIAL

## 2018-12-11 VITALS
RESPIRATION RATE: 17 BRPM | TEMPERATURE: 98.2 F | BODY MASS INDEX: 25.81 KG/M2 | OXYGEN SATURATION: 98 % | DIASTOLIC BLOOD PRESSURE: 62 MMHG | SYSTOLIC BLOOD PRESSURE: 102 MMHG | HEIGHT: 66 IN | WEIGHT: 160.6 LBS | HEART RATE: 91 BPM

## 2018-12-11 DIAGNOSIS — Z00.00 ROUTINE ADULT HEALTH MAINTENANCE: Primary | ICD-10-CM

## 2018-12-11 PROBLEM — Q01.8 TEMPORAL ENCEPHALOCELE (HCC): Status: ACTIVE | Noted: 2018-09-14

## 2018-12-11 PROBLEM — K21.9 GERD (GASTROESOPHAGEAL REFLUX DISEASE): Status: ACTIVE | Noted: 2018-09-17

## 2018-12-11 PROBLEM — Q16.5 TEGMEN DEFECT OF BASE OF SKULL: Status: ACTIVE | Noted: 2018-09-14

## 2018-12-11 PROBLEM — J30.89 ENVIRONMENTAL AND SEASONAL ALLERGIES: Status: ACTIVE | Noted: 2018-09-17

## 2018-12-11 PROCEDURE — 99396 PREV VISIT EST AGE 40-64: CPT | Performed by: PHYSICIAN ASSISTANT

## 2018-12-11 RX ORDER — OLOPATADINE HYDROCHLORIDE 1 MG/ML
SOLUTION/ DROPS OPHTHALMIC
COMMUNITY
Start: 2018-10-19

## 2018-12-11 RX ORDER — GABAPENTIN 100 MG/1
100 CAPSULE ORAL DAILY
COMMUNITY
End: 2020-08-24

## 2018-12-11 NOTE — PROGRESS NOTES
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  I do not feel patient needs any significant restrictions  She has no significant cardiovascular respiratory risk  Her exam is unremarkable  Vitals are normal   She is up-to-date with screening recommendations including her Pap, mammogram and colonoscopy  Flu vaccine is also up-to-date  She received her 1st Shingrix  Immunization in October and will be due in a few weeks for the next but she states she will come back in in March  She will follow up in 1 year for another physical   She declines blood work this year but will consider in the next  Chief Complaint   Patient presents with    Physical Exam     pt here for her yearly physical       Subjective:     Patient ID: Corbin Marley is a 59 y o  female  55y/o female here today for annual physical  She overall is doing well  She has continued having cholesteatoma right ear surgery, last surgery sept 2018 and doing well overall  Following [facility]  She also is seeing Dr Arlyn Mahajan for right arm pain and tingling ad neck pain - doing PT at [facility] and 100mg gabapentin  She attends dental cleanings q6 months  Last eye exam aug 2018  Well balanced diet  Eats out 1-2 x a week  Takes vitamins - MVI calcium + D3, fish oil  Irregular exercise  She is retiring next week, will be going to [employer]  LMP: approx 1955  Pt follows Dr Marcos Maria  Last PAP 10/2018  Colonoscopy was 12/2018  Wears seatbelt in car  Smoke alarms in home  Feels safe at home  Review of Systems   Constitutional: Negative  HENT: Negative  Respiratory: Negative  Cardiovascular: Negative  Gastrointestinal: Negative  Genitourinary: Negative  Musculoskeletal: Negative  Neurological: Negative  Psychiatric/Behavioral: Negative  The following portions of the patient's history were reviewed and updated as appropriate: allergies, current medications, past family history, past medical history, past social history, past surgical history and problem list       Objective:     Physical Exam   Constitutional: She is oriented to person, place, and time  Vital signs are normal  She appears well-developed and well-nourished  She does not appear ill  No distress  HENT:   Head: Normocephalic  Right Ear: Hearing, tympanic membrane and ear canal normal    Left Ear: Hearing, tympanic membrane and ear canal normal    Nose: Nose normal    Wearing hearing aids B/L   Eyes: Pupils are equal, round, and reactive to light  Conjunctivae, EOM and lids are normal    Neck: Trachea normal and normal range of motion  Neck supple  Normal carotid pulses present  No thyroid mass present  Cardiovascular: Normal rate, regular rhythm, S1 normal, S2 normal and normal pulses  No murmur heard  Pulmonary/Chest: Effort normal and breath sounds normal    Abdominal: Soft  Normal appearance, normal aorta and bowel sounds are normal  There is no tenderness  Musculoskeletal:   Gait normal    Lymphadenopathy:     She has no cervical adenopathy  Neurological: She is alert and oriented to person, place, and time  No cranial nerve deficit or sensory deficit  Reflex Scores:       Brachioradialis reflexes are 1+ on the right side and 1+ on the left side  Patellar reflexes are 1+ on the right side and 1+ on the left side  Skin: Skin is intact  Psychiatric: She has a normal mood and affect  Her behavior is normal  Cognition and memory are normal    Vitals reviewed        Vitals:    12/11/18 0805   BP: 102/62   BP Location: Left arm   Patient Position: Sitting   Cuff Size: Adult   Pulse: 91   Resp: 17   Temp: 98 2 °F (36 8 °C)   TempSrc: Tympanic   SpO2: 98%   Weight: 72 8 kg (160 lb 9 6 oz)   Height: 5' 6" (1 676 m)

## 2019-03-13 ENCOUNTER — CLINICAL SUPPORT (OUTPATIENT)
Dept: FAMILY MEDICINE CLINIC | Facility: CLINIC | Age: 59
End: 2019-03-13
Payer: COMMERCIAL

## 2019-03-13 DIAGNOSIS — Z23 NEED FOR ZOSTER VACCINATION: Primary | ICD-10-CM

## 2019-03-13 PROCEDURE — 90471 IMMUNIZATION ADMIN: CPT | Performed by: PHYSICIAN ASSISTANT

## 2019-03-13 PROCEDURE — 90750 HZV VACC RECOMBINANT IM: CPT | Performed by: PHYSICIAN ASSISTANT

## 2019-05-14 DIAGNOSIS — Z12.39 SCREENING FOR MALIGNANT NEOPLASM OF BREAST: ICD-10-CM

## 2019-05-27 ENCOUNTER — OFFICE VISIT (OUTPATIENT)
Dept: URGENT CARE | Facility: CLINIC | Age: 59
End: 2019-05-27
Payer: COMMERCIAL

## 2019-05-27 VITALS
TEMPERATURE: 98.7 F | SYSTOLIC BLOOD PRESSURE: 103 MMHG | HEIGHT: 67 IN | BODY MASS INDEX: 24.89 KG/M2 | WEIGHT: 158.6 LBS | HEART RATE: 84 BPM | OXYGEN SATURATION: 97 % | RESPIRATION RATE: 16 BRPM | DIASTOLIC BLOOD PRESSURE: 57 MMHG

## 2019-05-27 DIAGNOSIS — R35.0 URINARY FREQUENCY: ICD-10-CM

## 2019-05-27 DIAGNOSIS — N30.00 ACUTE CYSTITIS WITHOUT HEMATURIA: Primary | ICD-10-CM

## 2019-05-27 LAB
SL AMB  POCT GLUCOSE, UA: NEGATIVE
SL AMB LEUKOCYTE ESTERASE,UA: ABNORMAL
SL AMB POCT BILIRUBIN,UA: NEGATIVE
SL AMB POCT BLOOD,UA: ABNORMAL
SL AMB POCT CLARITY,UA: CLEAR
SL AMB POCT COLOR,UA: YELLOW
SL AMB POCT KETONES,UA: NEGATIVE
SL AMB POCT NITRITE,UA: NEGATIVE
SL AMB POCT PH,UA: 7.5
SL AMB POCT SPECIFIC GRAVITY,UA: 1
SL AMB POCT URINE PROTEIN: NEGATIVE
SL AMB POCT UROBILINOGEN: 0.2

## 2019-05-27 PROCEDURE — 99213 OFFICE O/P EST LOW 20 MIN: CPT | Performed by: NURSE PRACTITIONER

## 2019-05-27 PROCEDURE — 87186 SC STD MICRODIL/AGAR DIL: CPT | Performed by: NURSE PRACTITIONER

## 2019-05-27 PROCEDURE — 87086 URINE CULTURE/COLONY COUNT: CPT | Performed by: NURSE PRACTITIONER

## 2019-05-27 PROCEDURE — 87077 CULTURE AEROBIC IDENTIFY: CPT | Performed by: NURSE PRACTITIONER

## 2019-05-27 PROCEDURE — 81002 URINALYSIS NONAUTO W/O SCOPE: CPT | Performed by: NURSE PRACTITIONER

## 2019-05-27 RX ORDER — CIPROFLOXACIN 250 MG/1
250 TABLET, FILM COATED ORAL EVERY 12 HOURS SCHEDULED
Qty: 6 TABLET | Refills: 0 | Status: SHIPPED | OUTPATIENT
Start: 2019-05-27 | End: 2019-05-30

## 2019-05-27 RX ORDER — ACYCLOVIR 50 MG/G
CREAM TOPICAL
COMMUNITY
Start: 2018-10-04

## 2019-05-27 RX ORDER — AZELASTINE HCL 205.5 UG/1
SPRAY NASAL
COMMUNITY
Start: 2017-12-14 | End: 2019-08-21 | Stop reason: SDUPTHER

## 2019-05-29 LAB
BACTERIA UR CULT: ABNORMAL
BACTERIA UR CULT: ABNORMAL

## 2019-07-11 ENCOUNTER — OFFICE VISIT (OUTPATIENT)
Dept: FAMILY MEDICINE CLINIC | Facility: CLINIC | Age: 59
End: 2019-07-11
Payer: COMMERCIAL

## 2019-07-11 VITALS
SYSTOLIC BLOOD PRESSURE: 100 MMHG | OXYGEN SATURATION: 98 % | BODY MASS INDEX: 24.33 KG/M2 | RESPIRATION RATE: 17 BRPM | TEMPERATURE: 98 F | HEART RATE: 93 BPM | HEIGHT: 67 IN | DIASTOLIC BLOOD PRESSURE: 62 MMHG | WEIGHT: 155 LBS

## 2019-07-11 DIAGNOSIS — R30.0 DYSURIA: Primary | ICD-10-CM

## 2019-07-11 DIAGNOSIS — N39.0 ACUTE UTI: ICD-10-CM

## 2019-07-11 LAB
SL AMB  POCT GLUCOSE, UA: ABNORMAL
SL AMB LEUKOCYTE ESTERASE,UA: ABNORMAL
SL AMB POCT BILIRUBIN,UA: ABNORMAL
SL AMB POCT BLOOD,UA: ABNORMAL
SL AMB POCT CLARITY,UA: CLEAR
SL AMB POCT COLOR,UA: YELLOW
SL AMB POCT KETONES,UA: ABNORMAL
SL AMB POCT NITRITE,UA: ABNORMAL
SL AMB POCT PH,UA: 7
SL AMB POCT SPECIFIC GRAVITY,UA: 1.01
SL AMB POCT URINE PROTEIN: ABNORMAL
SL AMB POCT UROBILINOGEN: 0.2

## 2019-07-11 PROCEDURE — 87086 URINE CULTURE/COLONY COUNT: CPT | Performed by: FAMILY MEDICINE

## 2019-07-11 PROCEDURE — 81003 URINALYSIS AUTO W/O SCOPE: CPT | Performed by: FAMILY MEDICINE

## 2019-07-11 PROCEDURE — 99214 OFFICE O/P EST MOD 30 MIN: CPT | Performed by: FAMILY MEDICINE

## 2019-07-11 RX ORDER — CIPROFLOXACIN 500 MG/1
500 TABLET, FILM COATED ORAL EVERY 12 HOURS SCHEDULED
Qty: 14 TABLET | Refills: 0 | Status: SHIPPED | OUTPATIENT
Start: 2019-07-11 | End: 2019-07-18

## 2019-07-11 NOTE — PROGRESS NOTES
Assessment/Plan:   1  Acute UTI  Urine was positive for leukocytes and nitrites  At this time, symptoms appear likely secondary to persistence of her UTI  Will send urine for culture  Maintain proper hydration  She may continue with her over-the-counter peridium  Will start treatment with Cipro 500 mg b i d  For 7 days  She was advised to call if her symptoms are persisting   - Urine culture  - ciprofloxacin (CIPRO) 500 mg tablet; Take 1 tablet (500 mg total) by mouth every 12 (twelve) hours for 7 days  Dispense: 14 tablet; Refill: 0  - POCT urine dip auto non-scope       Diagnoses and all orders for this visit:    Dysuria  -     POCT urine dip auto non-scope    Other orders  -     Urine culture; Future          Subjective:    Chief Complaint   Patient presents with    Difficulty Urinating     frequent urination, pt has been taking otc pyridium         Patient ID: Diana Johnson is a 62 y o  female  Difficulty Urinating    This is a new problem  Episode onset: 5 days ago  The problem occurs every urination  The problem has been unchanged  The quality of the pain is described as aching  The pain is mild  There has been no fever  She is not sexually active  There is no history of pyelonephritis  Associated symptoms include frequency  Pertinent negatives include no chills, discharge, flank pain or nausea  Associated symptoms comments: Suprapubic pain  Treatments tried: Cipro 250 BID for three days  The treatment provided mild relief  Review of Systems   Constitutional: Negative for activity change, chills, fatigue and fever  HENT: Negative for congestion, ear pain, sinus pressure and sore throat  Eyes: Negative for redness, itching and visual disturbance  Respiratory: Negative for cough and shortness of breath  Cardiovascular: Negative for chest pain and palpitations  Gastrointestinal: Negative for abdominal pain, diarrhea and nausea     Endocrine: Negative for cold intolerance and heat intolerance  Genitourinary: Positive for dysuria and frequency  Negative for flank pain  Musculoskeletal: Negative for arthralgias, back pain, gait problem and myalgias  Skin: Negative for color change  Allergic/Immunologic: Negative for environmental allergies  Neurological: Negative for dizziness, numbness and headaches  Psychiatric/Behavioral: Negative for behavioral problems and sleep disturbance  The following portions of the patient's history were reviewed and updated as appropriate : past family history, past medical history, past social history and past surgical history        Current Outpatient Medications:     acyclovir (ZOVIRAX) 5 % cream, Zovirax 5 % topical cream, Disp: , Rfl:     azelastine (ASTELIN) 0 1 % nasal spray, 1 spray into each nostril 2 (two) times a day for 180 days Use in each nostril as directed, Disp: 1 Bottle, Rfl: 5    azelastine (OPTIVAR) 0 05 % ophthalmic solution, Administer to both eyes daily For seasonal allergies , Disp: , Rfl:     Calcium Carb-Cholecalciferol (CALCIUM 500 +D PO), Take 1 tablet by mouth daily, Disp: , Rfl:     EPINEPHrine (EPIPEN 2-HERBERT) 0 3 mg/0 3 mL SOAJ, Inject into a muscle as needed, Disp: , Rfl:     Ergocalciferol (VITAMIN D2 PO), Take 1 tablet by mouth daily  , Disp: , Rfl:     Esomeprazole Magnesium (NEXIUM PO), Take 1 tablet by mouth daily as needed, Disp: , Rfl:     multivitamin (THERAGRAN) TABS, Take 1 tablet by mouth daily, Disp: , Rfl:     valACYclovir (VALTREX) 1,000 mg tablet, Take 1 tablet by mouth daily, Disp: , Rfl:     zolpidem (AMBIEN) 5 mg tablet, Take 1 tablet (5 mg total) by mouth daily at bedtime as needed for sleep, Disp: 30 tablet, Rfl: 5    Azelastine HCl 0 15 % SOLN, azelastine 0 15 % (205 5 mcg) nasal spray, Disp: , Rfl:     Azelastine-Fluticasone 137-50 MCG/ACT SUSP, 2 Squirts into each nostril 2 (two) times a day, Disp: , Rfl:     AZELASTINE-FLUTICASONE NA, azelastine 137 mcg (0 1 %) nasal spray aerosol, Disp: , Rfl:     gabapentin (NEURONTIN) 100 mg capsule, Take 100 mg by mouth daily, Disp: , Rfl:     methylprednisolone (MEDROL) 4 mg tablet, 6, 6, 5, 5, 4, 4, 3, 3, 2, 2,1,1 (Patient not taking: Reported on 7/11/2019), Disp: 42 tablet, Rfl: 1    olopatadine (PATANOL) 0 1 % ophthalmic solution, , Disp: , Rfl:     Tuberculin-Allergy Syringes (B-D ALLERGY SYRINGE 1CC/28G) 28G X 1/2" 1 ML MISC, every 14 (fourteen) days, Disp: , Rfl:     Objective:    Vitals:    07/11/19 0853   BP: 100/62   BP Location: Left arm   Patient Position: Sitting   Pulse: 93   Resp: 17   Temp: 98 °F (36 7 °C)   TempSrc: Tympanic   SpO2: 98%   Weight: 70 3 kg (155 lb)   Height: 5' 7" (1 702 m)        Physical Exam   Constitutional: She is oriented to person, place, and time  She appears well-developed and well-nourished  HENT:   Head: Normocephalic and atraumatic  Nose: Nose normal    Mouth/Throat: No oropharyngeal exudate  Eyes: Pupils are equal, round, and reactive to light  Right eye exhibits no discharge  Left eye exhibits no discharge  Neck: Normal range of motion  Neck supple  No tracheal deviation present  Cardiovascular: Normal rate, regular rhythm and intact distal pulses  Exam reveals no gallop and no friction rub  No murmur heard  Pulses:       Dorsalis pedis pulses are 2+ on the right side, and 2+ on the left side  Posterior tibial pulses are 2+ on the right side, and 2+ on the left side  Pulmonary/Chest: Effort normal and breath sounds normal  No respiratory distress  She has no wheezes  She has no rales  Abdominal: Soft  Bowel sounds are normal  She exhibits no distension  There is no tenderness  There is no rebound and no guarding  Musculoskeletal: Normal range of motion  She exhibits no edema  Lymphadenopathy:        Head (right side): No submental and no submandibular adenopathy present  Head (left side): No submental and no submandibular adenopathy present       She has no cervical adenopathy  Right cervical: No superficial cervical, no deep cervical and no posterior cervical adenopathy present  Left cervical: No superficial cervical, no deep cervical and no posterior cervical adenopathy present  Neurological: She is alert and oriented to person, place, and time  No cranial nerve deficit or sensory deficit  Skin: Skin is warm, dry and intact  Psychiatric: Her speech is normal and behavior is normal  Judgment normal  Her mood appears not anxious  Cognition and memory are normal  She does not exhibit a depressed mood  Vitals reviewed

## 2019-07-12 LAB — BACTERIA UR CULT: NORMAL

## 2019-08-21 ENCOUNTER — OFFICE VISIT (OUTPATIENT)
Dept: FAMILY MEDICINE CLINIC | Facility: CLINIC | Age: 59
End: 2019-08-21
Payer: COMMERCIAL

## 2019-08-21 VITALS
HEIGHT: 68 IN | RESPIRATION RATE: 16 BRPM | TEMPERATURE: 98.2 F | BODY MASS INDEX: 23.92 KG/M2 | DIASTOLIC BLOOD PRESSURE: 62 MMHG | SYSTOLIC BLOOD PRESSURE: 104 MMHG | OXYGEN SATURATION: 97 % | WEIGHT: 157.8 LBS | HEART RATE: 74 BPM

## 2019-08-21 DIAGNOSIS — H90.A31 MIXED CONDUCTIVE AND SENSORINEURAL HEARING LOSS OF RIGHT EAR WITH RESTRICTED HEARING OF LEFT EAR: ICD-10-CM

## 2019-08-21 DIAGNOSIS — H69.83 DYSFUNCTION OF BOTH EUSTACHIAN TUBES: ICD-10-CM

## 2019-08-21 DIAGNOSIS — Z01.810 PREOP CARDIOVASCULAR EXAM: Primary | ICD-10-CM

## 2019-08-21 PROCEDURE — 99243 OFF/OP CNSLTJ NEW/EST LOW 30: CPT | Performed by: FAMILY MEDICINE

## 2019-08-21 RX ORDER — VALACYCLOVIR HYDROCHLORIDE 1 G/1
1000 TABLET, FILM COATED ORAL DAILY
Qty: 30 TABLET | Refills: 0 | Status: CANCELLED | OUTPATIENT
Start: 2019-08-21 | End: 2019-09-20

## 2019-08-21 NOTE — PROGRESS NOTES
Assessment/Plan:   1  Preop cardiovascular exam/Dysfunction of both eustachian tubes/Mixed conductive and sensorineural hearing loss of right ear with restricted hearing of left ear  Patient appears well today  She has a good functional capacity and no active cardiac problems  Her EKG today appear to show normal sinus rhythm, no ST or T-wave changes  She will be completing preop blood work next week  This type of procedure is considered low risk  She is having this procedure completed outpatient  At this time, patient is cleared with low perioperative cardiovascular risk  No further testing needed after her blood work  Blood work results received on 9/4/2019  All blood work appeared very well controlled  Patient remains cleared with low perioperative cardiovascular risk  Diagnoses and all orders for this visit:    Preop cardiovascular exam    Other orders  -     valACYclovir (VALTREX) 1,000 mg tablet; Take 1 tablet (1,000 mg total) by mouth daily for 30 doses          Subjective:    Chief Complaint   Patient presents with    Pre-op Exam     9/18/2019 Dialating eustachian tubes        Patient ID: Aurea Curran is a 62 y o  female  Aurea Curran  62 y o   female    SURGEON:Dr Colon Number    SURGERY/PROCEDURE: dilation of B/L eustachian tubes    DATE OF SURGERY: 9/18/19    PRIOR ANESTHESIA:yes    COMPLICATION: Moderate Nausea    BLEEDING PROBLEM: no    PERTINENT PMH: no    EXERCISE CAPACITY:   CAN WALK 4 BLOCKS AND OR CLIMB 2 FLIGHTS: Yes    HOME LIVING SITUATION SAFE AND SECURE: Yes      TOBACCO: no     ETOH: yes     ILLEGAL DRUGS: no        Review of Systems   Constitutional: Negative for activity change, chills, fatigue and fever  HENT: Negative for congestion, ear pain, sinus pressure and sore throat  Eyes: Negative for redness, itching and visual disturbance  Respiratory: Negative for cough and shortness of breath  Cardiovascular: Negative for chest pain and palpitations  Gastrointestinal: Negative for abdominal pain, diarrhea and nausea  Endocrine: Negative for cold intolerance and heat intolerance  Genitourinary: Negative for dysuria, flank pain and frequency  Musculoskeletal: Negative for arthralgias, back pain, gait problem and myalgias  Skin: Negative for color change  Allergic/Immunologic: Negative for environmental allergies  Neurological: Negative for dizziness, numbness and headaches  Psychiatric/Behavioral: Negative for behavioral problems and sleep disturbance  The following portions of the patient's history were reviewed and updated as appropriate : past family history, past medical history, past social history and past surgical history        Current Outpatient Medications:     acyclovir (ZOVIRAX) 5 % cream, Zovirax 5 % topical cream, Disp: , Rfl:     azelastine (ASTELIN) 0 1 % nasal spray, 1 spray into each nostril 2 (two) times a day for 180 days Use in each nostril as directed, Disp: 1 Bottle, Rfl: 5    Calcium Carb-Cholecalciferol (CALCIUM 500 +D PO), Take 1 tablet by mouth daily, Disp: , Rfl:     EPINEPHrine (EPIPEN 2-HERBERT) 0 3 mg/0 3 mL SOAJ, Inject into a muscle as needed, Disp: , Rfl:     Ergocalciferol (VITAMIN D2 PO), Take 1 tablet by mouth daily  , Disp: , Rfl:     Esomeprazole Magnesium (NEXIUM PO), Take 1 tablet by mouth daily as needed, Disp: , Rfl:     multivitamin (THERAGRAN) TABS, Take 1 tablet by mouth daily, Disp: , Rfl:     olopatadine (PATANOL) 0 1 % ophthalmic solution, , Disp: , Rfl:     valACYclovir (VALTREX) 1,000 mg tablet, Take 1 tablet by mouth daily, Disp: , Rfl:     zolpidem (AMBIEN) 5 mg tablet, Take 1 tablet (5 mg total) by mouth daily at bedtime as needed for sleep, Disp: 30 tablet, Rfl: 5    gabapentin (NEURONTIN) 100 mg capsule, Take 100 mg by mouth daily, Disp: , Rfl:     methylprednisolone (MEDROL) 4 mg tablet, 6, 6, 5, 5, 4, 4, 3, 3, 2, 2,1,1 (Patient not taking: Reported on 7/11/2019), Disp: 42 tablet, Rfl: 1    Tuberculin-Allergy Syringes (B-D ALLERGY SYRINGE 1CC/28G) 28G X 1/2" 1 ML MISC, every 14 (fourteen) days, Disp: , Rfl:     Objective:    Vitals:    08/21/19 0839   BP: 104/62   BP Location: Left arm   Patient Position: Sitting   Pulse: 74   Resp: 16   Temp: 98 2 °F (36 8 °C)   TempSrc: Tympanic   SpO2: 97%   Weight: 71 6 kg (157 lb 12 8 oz)   Height: 5' 7 75" (1 721 m)        Physical Exam   Constitutional: She is oriented to person, place, and time  She appears well-developed and well-nourished  HENT:   Head: Normocephalic and atraumatic  Nose: Nose normal    Mouth/Throat: No oropharyngeal exudate  Eyes: Pupils are equal, round, and reactive to light  Right eye exhibits no discharge  Left eye exhibits no discharge  Neck: Normal range of motion  Neck supple  No tracheal deviation present  Cardiovascular: Normal rate, regular rhythm and intact distal pulses  Exam reveals no gallop and no friction rub  No murmur heard  Pulses:       Dorsalis pedis pulses are 2+ on the right side, and 2+ on the left side  Posterior tibial pulses are 2+ on the right side, and 2+ on the left side  Pulmonary/Chest: Effort normal and breath sounds normal  No respiratory distress  She has no wheezes  She has no rales  Abdominal: Soft  Bowel sounds are normal  She exhibits no distension  There is no tenderness  There is no rebound and no guarding  Musculoskeletal: Normal range of motion  She exhibits no edema  Lymphadenopathy:        Head (right side): No submental and no submandibular adenopathy present  Head (left side): No submental and no submandibular adenopathy present  She has no cervical adenopathy  Right cervical: No superficial cervical, no deep cervical and no posterior cervical adenopathy present  Left cervical: No superficial cervical, no deep cervical and no posterior cervical adenopathy present     Neurological: She is alert and oriented to person, place, and time  No cranial nerve deficit or sensory deficit  Skin: Skin is warm, dry and intact  Psychiatric: Her speech is normal and behavior is normal  Judgment normal  Her mood appears not anxious  Cognition and memory are normal  She does not exhibit a depressed mood  Vitals reviewed

## 2019-10-22 ENCOUNTER — ANNUAL EXAM (OUTPATIENT)
Dept: OBGYN CLINIC | Facility: MEDICAL CENTER | Age: 59
End: 2019-10-22
Payer: COMMERCIAL

## 2019-10-22 VITALS — WEIGHT: 156.4 LBS | SYSTOLIC BLOOD PRESSURE: 100 MMHG | BODY MASS INDEX: 23.96 KG/M2 | DIASTOLIC BLOOD PRESSURE: 60 MMHG

## 2019-10-22 DIAGNOSIS — Z01.419 ENCOUNTER FOR WELL WOMAN EXAM WITH ROUTINE GYNECOLOGICAL EXAM: Primary | ICD-10-CM

## 2019-10-22 DIAGNOSIS — Z12.31 ENCOUNTER FOR SCREENING MAMMOGRAM FOR MALIGNANT NEOPLASM OF BREAST: ICD-10-CM

## 2019-10-22 DIAGNOSIS — G47.00 INSOMNIA, UNSPECIFIED TYPE: ICD-10-CM

## 2019-10-22 PROCEDURE — S0612 ANNUAL GYNECOLOGICAL EXAMINA: HCPCS | Performed by: OBSTETRICS & GYNECOLOGY

## 2019-10-22 RX ORDER — ZOLPIDEM TARTRATE 5 MG/1
5 TABLET ORAL
Qty: 30 TABLET | Refills: 5 | Status: SHIPPED | OUTPATIENT
Start: 2019-10-22 | End: 2020-11-17 | Stop reason: SDUPTHER

## 2019-10-22 NOTE — PROGRESS NOTES
ASSESSMENT & PLAN: Madonna Thomas is a 62 y o  [id number] with normal gynecologic exam     1   Routine well woman exam done today  2  Pap and HPV:  The patient's last pap was   It was normal   Pap was not done today  Current ASCCP Guidelines reviewed  Cytology q 3 years  3  Mammogram ordered, due May 2020  4  Colorectal cancer screening was not ordered  Done   5  The following were reviewed in today's visit: breast self exam and mammography screening ordered  6  Uses Ambien sparingly, script provided, PMED reviewed    CC:  Annual Gynecologic Examination    HPI: Madonna Thomas is a 62 y o  [id number] who presents for annual gynecologic examination  She has the following concerns:  No GYN complaints; still gets the LLQ pain occasionally  Had normal ultrasound and colonoscopy   Health Maintenance:    She wears her seatbelt routinely  She does perform regular monthly self breast exams  She feels safe at home  Pap: 2018  Last mammogram: 2019  Last colonoscopy:     Past Medical History:   Diagnosis Date    Allergic rhinitis     LAST ASSESSED [id number]    Arthritis     osteo    Chest pain     LAST ASSESSED [id number]    Dysuria     LAST ASSESSED [id number]    Esophageal reflux     LAST ASSESSED [id number]    PMB (postmenopausal bleeding)     RESOLVED [id number]    PONV (postoperative nausea and vomiting)     Recurrent cold sores     LAST ASSESSED [id number]       Past Surgical History:   Procedure Laterality Date    CHOLESTEATOMA EXCISION  2018    COLONOSCOPY      COLONOSCOPY      CRANIOTOMY  2018    LYMPHADENECTOMY      Left Axilla    [place] COLONOSCOPY FLX DX W/COLLJ SPEC WHEN PFRMD N/A 2018    Procedure: COLONOSCOPY;  Surgeon: Lou Basilio MD;  Location: [facility];   Service: Gastroenterology    TUBAL LIGATION         Past OB/Gyn History:  OB History        3    Para   2    Term   2            AB   1    Living   2       SAB   1    TAB        Ectopic Multiple        Live Births   2               Pt does not have menstrual issues   Menopausal   History of sexually transmitted infection: No   History of abnormal pap smears: No          Family History   Problem Relation Age of Onset   [facility] Diabetes Mother     Hypertension Mother     Stroke Mother     Arthritis Father     Hearing loss Father     Alcohol abuse Neg Hx     Substance Abuse Neg Hx     Mental illness Neg Hx     Depression Neg Hx        Social History:  Social History     Socioeconomic History    Marital status: /Civil Union     Spouse name: Not on file    Number of children: Not on file    Years of education: Not on file    Highest education level: Not on file   Occupational History    Not on file   Social Needs    Financial resource strain: Not on file    Food insecurity:     Worry: Not on file     Inability: Not on file    Transportation needs:     Medical: Not on file     Non-medical: Not on file   Tobacco Use    Smoking status: Former Smoker    Smokeless tobacco: Never Used   Substance and Sexual Activity    Alcohol use: Yes     Comment: SOCIAL     Drug use: No    Sexual activity: Yes     Partners: Male     Birth control/protection: Female Sterilization, Post-menopausal   Lifestyle    Physical activity:     Days per week: Not on file     Minutes per session: Not on file    Stress: Not on file   Relationships    Social connections:     Talks on phone: Not on file     Gets together: Not on file     Attends Yazidism service: Not on file     Active member of club or organization: Not on file     Attends meetings of clubs or organizations: Not on file     Relationship status: Not on file    Intimate partner violence:     Fear of current or ex partner: Not on file     Emotionally abused: Not on file     Physically abused: Not on file     Forced sexual activity: Not on file   Other Topics Concern    Not on file   Social History Narrative    Not on file       Allergies Allergen Reactions    Sulfa Antibiotics Swelling and Rash    Sulfasalazine Swelling    Itraconazole Rash       Current Outpatient Medications:     acyclovir (ZOVIRAX) 5 % cream, Zovirax 5 % topical cream, Disp: , Rfl:     azelastine (ASTELIN) 0 1 % nasal spray, 1 spray into each nostril 2 (two) times a day for 180 days Use in each nostril as directed, Disp: 1 Bottle, Rfl: 5    Calcium Carb-Cholecalciferol (CALCIUM 500 +D PO), Take 1 tablet by mouth daily, Disp: , Rfl:     EPINEPHrine (EPIPEN 2-HERBERT) 0 3 mg/0 3 mL SOAJ, Inject into a muscle as needed, Disp: , Rfl:     Ergocalciferol (VITAMIN D2 PO), Take 1 tablet by mouth daily  , Disp: , Rfl:     Esomeprazole Magnesium (NEXIUM PO), Take 1 tablet by mouth daily as needed, Disp: , Rfl:     multivitamin (THERAGRAN) TABS, Take 1 tablet by mouth daily, Disp: , Rfl:     olopatadine (PATANOL) 0 1 % ophthalmic solution, , Disp: , Rfl:     Tuberculin-Allergy Syringes (B-D ALLERGY SYRINGE 1CC/28G) 28G X 1/2" 1 ML MISC, every 14 (fourteen) days, Disp: , Rfl:     valACYclovir (VALTREX) 1,000 mg tablet, Take 1 tablet by mouth as needed , Disp: , Rfl:     zolpidem (AMBIEN) 5 mg tablet, Take 1 tablet (5 mg total) by mouth daily at bedtime as needed for sleep, Disp: 30 tablet, Rfl: 5    gabapentin (NEURONTIN) 100 mg capsule, Take 100 mg by mouth daily, Disp: , Rfl:     methylprednisolone (MEDROL) 4 mg tablet, 6, 6, 5, 5, 4, 4, 3, 3, 2, 2,1,1 (Patient not taking: Reported on 7/11/2019), Disp: 42 tablet, Rfl: 1      Review of Systems  Constitutional :no fever, feels well, no tiredness, no recent weight gain or loss  ENT: no ear ache, no loss of hearing, no nosebleeds or nasal discharge, no sore throat or hoarseness  Cardiovascular: no complaints of slow or fast heart beat, no chest pain, no palpitations, no leg claudication or lower extremity edema    Respiratory: no complaints of shortness of shortness of breath, no WADE  Breasts:no complaints of breast pain, breast lump, or nipple discharge  Gastrointestinal: no complaints of abdominal pain, constipation, nausea, vomiting, or diarrhea or bloody stools  Genitourinary : no complaints of dysuria, incontinence, pelvic pain, no dysmenorrhea, vaginal discharge or abnormal vaginal bleeding and as noted in HPI  Musculoskeletal: no complaints of arthralgia, no myalgia, no joint swelling or stiffness, no limb pain or swelling  Integumentary: no complaints of skin rash or lesion, itching or dry skin  Neurological: no complaints of headache, no confusion, no numbness or tingling, no dizziness or fainting    Objective      /60   Wt 70 9 kg (156 lb 6 4 oz)   BMI 23 96 kg/m²     General:   appears stated age, cooperative, alert normal mood and affect   Neck: normal, supple,trachea midline, no masses   Heart: regular rate and rhythm, S1, S2 normal, no murmur, click, rub or gallop   Lungs: clear to auscultation bilaterally   Breasts: normal appearance, no masses or tenderness, Inspection negative, No nipple retraction or dimpling, No nipple discharge or bleeding, No axillary or supraclavicular adenopathy, Normal to palpation without dominant masses   Abdomen: soft, non-tender, without masses or organomegaly   Vulva: normal female genitalia, Bartholin's, Urethra, [place] normal, no lesions, normal female hair distribution   Vagina: normal vagina, no discharge, exudate, lesion, or erythema   Urethra: normal   Cervix: Normal, no discharge  Uterus: normal size, contour, position, consistency, mobility, non-tender   Adnexa: normal adnexa   Lymphatic palpation of lymph nodes in neck, axilla, groin and/or other locations: no lymphadenopathy or masses noted   Skin normal skin turgor and no rashes     Psychiatric orientation to person, place, and time: normal  mood and affect: normal

## 2019-10-22 NOTE — PATIENT INSTRUCTIONS
Thank you for your confidence in our team    We appreciate you and welcome your feedback  If you receive a survey from us, please take a few moments to let us know how we are doing     Sincerely,   Rosey Powers MD

## 2019-11-12 ENCOUNTER — IMMUNIZATIONS (OUTPATIENT)
Dept: FAMILY MEDICINE CLINIC | Facility: CLINIC | Age: 59
End: 2019-11-12
Payer: COMMERCIAL

## 2019-11-12 DIAGNOSIS — Z23 NEED FOR VACCINATION: Primary | ICD-10-CM

## 2019-11-12 PROCEDURE — 90471 IMMUNIZATION ADMIN: CPT | Performed by: FAMILY MEDICINE

## 2019-11-12 PROCEDURE — 90682 RIV4 VACC RECOMBINANT DNA IM: CPT | Performed by: FAMILY MEDICINE

## 2019-12-12 ENCOUNTER — TELEPHONE (OUTPATIENT)
Dept: FAMILY MEDICINE CLINIC | Facility: CLINIC | Age: 59
End: 2019-12-12

## 2019-12-13 ENCOUNTER — TELEPHONE (OUTPATIENT)
Dept: FAMILY MEDICINE CLINIC | Facility: CLINIC | Age: 59
End: 2019-12-13

## 2019-12-13 ENCOUNTER — OFFICE VISIT (OUTPATIENT)
Dept: FAMILY MEDICINE CLINIC | Facility: CLINIC | Age: 59
End: 2019-12-13
Payer: COMMERCIAL

## 2019-12-13 VITALS
WEIGHT: 153 LBS | HEART RATE: 91 BPM | BODY MASS INDEX: 24.01 KG/M2 | HEIGHT: 67 IN | SYSTOLIC BLOOD PRESSURE: 102 MMHG | DIASTOLIC BLOOD PRESSURE: 64 MMHG | OXYGEN SATURATION: 98 % | TEMPERATURE: 98.4 F | RESPIRATION RATE: 16 BRPM

## 2019-12-13 DIAGNOSIS — R30.0 DYSURIA: Primary | ICD-10-CM

## 2019-12-13 DIAGNOSIS — N30.00 ACUTE CYSTITIS WITHOUT HEMATURIA: ICD-10-CM

## 2019-12-13 LAB
SL AMB  POCT GLUCOSE, UA: ABNORMAL
SL AMB LEUKOCYTE ESTERASE,UA: ABNORMAL
SL AMB POCT BILIRUBIN,UA: ABNORMAL
SL AMB POCT BLOOD,UA: ABNORMAL
SL AMB POCT CLARITY,UA: CLEAR
SL AMB POCT COLOR,UA: YELLOW
SL AMB POCT KETONES,UA: ABNORMAL
SL AMB POCT NITRITE,UA: ABNORMAL
SL AMB POCT PH,UA: 6
SL AMB POCT SPECIFIC GRAVITY,UA: 1.02
SL AMB POCT URINE PROTEIN: ABNORMAL
SL AMB POCT UROBILINOGEN: 0.2

## 2019-12-13 PROCEDURE — 81003 URINALYSIS AUTO W/O SCOPE: CPT | Performed by: FAMILY MEDICINE

## 2019-12-13 PROCEDURE — 3008F BODY MASS INDEX DOCD: CPT | Performed by: FAMILY MEDICINE

## 2019-12-13 PROCEDURE — 99214 OFFICE O/P EST MOD 30 MIN: CPT | Performed by: FAMILY MEDICINE

## 2019-12-13 PROCEDURE — 87086 URINE CULTURE/COLONY COUNT: CPT | Performed by: FAMILY MEDICINE

## 2019-12-13 PROCEDURE — 1036F TOBACCO NON-USER: CPT | Performed by: FAMILY MEDICINE

## 2019-12-13 RX ORDER — CIPROFLOXACIN 500 MG/1
500 TABLET, FILM COATED ORAL EVERY 12 HOURS SCHEDULED
Qty: 14 TABLET | Refills: 0 | Status: SHIPPED | OUTPATIENT
Start: 2019-12-13 | End: 2019-12-20

## 2019-12-13 RX ORDER — PHENAZOPYRIDINE HYDROCHLORIDE 200 MG/1
200 TABLET, FILM COATED ORAL
Qty: 10 TABLET | Refills: 0 | Status: SHIPPED | OUTPATIENT
Start: 2019-12-13 | End: 2020-08-24

## 2019-12-13 NOTE — TELEPHONE ENCOUNTER
Pt does not want pyridium  Called and LMOM at [facility] to cancel RX

## 2019-12-13 NOTE — PROGRESS NOTES
Assessment/Plan:   1  Acute cystitis without hematuria  Urinalysis was positive for trace leukocytes  Will send urine for culture  Start treatment with Cipro 500 mg b i d  For 7 days  Follow up if any symptoms persist    - POCT urine dip auto non-scope  - Urine culture  - ciprofloxacin (CIPRO) 500 mg tablet; Take 1 tablet (500 mg total) by mouth every 12 (twelve) hours for 7 days  Dispense: 14 tablet; Refill: 0  - phenazopyridine (PYRIDIUM) 200 mg tablet; Take 1 tablet (200 mg total) by mouth 3 (three) times a day with meals  Dispense: 10 tablet; Refill: 0             Diagnoses and all orders for this visit:    Dysuria  -     POCT urine dip auto non-scope  -     Urine culture    Acute cystitis without hematuria  -     ciprofloxacin (CIPRO) 500 mg tablet; Take 1 tablet (500 mg total) by mouth every 12 (twelve) hours for 7 days  -     phenazopyridine (PYRIDIUM) 200 mg tablet; Take 1 tablet (200 mg total) by mouth 3 (three) times a day with meals    Other orders  -     cimetidine (TAGAMET) 200 mg tablet; Take 200 mg by mouth 4 (four) times a day          Subjective:       Chief Complaint   Patient presents with    Urinary Tract Infection     sxs started about 4 days ago  Home test positive for Leukocytes  Taking Cranberry and AZO  Patient ID: Manjeet Vyas is a 62 y o  female  Urinary Tract Infection    This is a new problem  The current episode started in the past 7 days  The problem occurs every urination  The problem has been unchanged  The quality of the pain is described as burning  The pain is mild  There has been no fever  She is not sexually active  There is no history of pyelonephritis  Associated symptoms include frequency  Pertinent negatives include no chills, flank pain, hematuria, nausea or urgency  Treatments tried: azo  The treatment provided mild relief  Review of Systems   Constitutional: Negative for activity change, chills, fatigue and fever     HENT: Negative for congestion, ear pain, sinus pressure and sore throat  Eyes: Negative for redness, itching and visual disturbance  Respiratory: Negative for cough and shortness of breath  Cardiovascular: Negative for chest pain and palpitations  Gastrointestinal: Negative for abdominal pain, diarrhea and nausea  Endocrine: Negative for cold intolerance and heat intolerance  Genitourinary: Positive for frequency  Negative for dysuria, flank pain, hematuria and urgency  Musculoskeletal: Negative for arthralgias, back pain, gait problem and myalgias  Skin: Negative for color change  Allergic/Immunologic: Negative for environmental allergies  Neurological: Negative for dizziness, numbness and headaches  Psychiatric/Behavioral: Negative for behavioral problems and sleep disturbance  The following portions of the patient's history were reviewed and updated as appropriate : past family history, past medical history, past social history and past surgical history      Current Outpatient Medications:     azelastine (ASTELIN) 0 1 % nasal spray, 1 spray into each nostril 2 (two) times a day for 180 days Use in each nostril as directed, Disp: 1 Bottle, Rfl: 5    cimetidine (TAGAMET) 200 mg tablet, Take 200 mg by mouth 4 (four) times a day, Disp: , Rfl:     Esomeprazole Magnesium (NEXIUM PO), Take 1 tablet by mouth daily as needed, Disp: , Rfl:     acyclovir (ZOVIRAX) 5 % cream, Zovirax 5 % topical cream, Disp: , Rfl:     Calcium Carb-Cholecalciferol (CALCIUM 500 +D PO), Take 1 tablet by mouth daily, Disp: , Rfl:     ciprofloxacin (CIPRO) 500 mg tablet, Take 1 tablet (500 mg total) by mouth every 12 (twelve) hours for 7 days, Disp: 14 tablet, Rfl: 0    EPINEPHrine (EPIPEN 2-HERBERT) 0 3 mg/0 3 mL SOAJ, Inject into a muscle as needed, Disp: , Rfl:     Ergocalciferol (VITAMIN D2 PO), Take 1 tablet by mouth daily  , Disp: , Rfl:     gabapentin (NEURONTIN) 100 mg capsule, Take 100 mg by mouth daily, Disp: , Rfl:    methylprednisolone (MEDROL) 4 mg tablet, 6, 6, 5, 5, 4, 4, 3, 3, 2, 2,1,1 (Patient not taking: Reported on 7/11/2019), Disp: 42 tablet, Rfl: 1    multivitamin (THERAGRAN) TABS, Take 1 tablet by mouth daily, Disp: , Rfl:     olopatadine (PATANOL) 0 1 % ophthalmic solution, , Disp: , Rfl:     phenazopyridine (PYRIDIUM) 200 mg tablet, Take 1 tablet (200 mg total) by mouth 3 (three) times a day with meals, Disp: 10 tablet, Rfl: 0    Tuberculin-Allergy Syringes (B-D ALLERGY SYRINGE 1CC/28G) 28G X 1/2" 1 ML MISC, every 14 (fourteen) days, Disp: , Rfl:     valACYclovir (VALTREX) 1,000 mg tablet, Take 1 tablet by mouth as needed , Disp: , Rfl:     zolpidem (AMBIEN) 5 mg tablet, Take 1 tablet (5 mg total) by mouth daily at bedtime as needed for sleep (Patient not taking: Reported on 12/13/2019), Disp: 30 tablet, Rfl: 5         Objective:         Vitals:    12/13/19 1350   BP: 102/64   BP Location: Left arm   Patient Position: Sitting   Cuff Size: Adult   Pulse: 91   Resp: 16   Temp: 98 4 °F (36 9 °C)   TempSrc: Tympanic   SpO2: 98%   Weight: 69 4 kg (153 lb)   Height: 5' 7" (1 702 m)     Physical Exam   Constitutional: She is oriented to person, place, and time  She appears well-developed and well-nourished  HENT:   Head: Normocephalic and atraumatic  Nose: Nose normal    Mouth/Throat: No oropharyngeal exudate  Eyes: Pupils are equal, round, and reactive to light  Right eye exhibits no discharge  Left eye exhibits no discharge  Neck: Normal range of motion  Neck supple  No tracheal deviation present  Cardiovascular: Normal rate, regular rhythm and intact distal pulses  Exam reveals no gallop and no friction rub  No murmur heard  Pulses:       Dorsalis pedis pulses are 2+ on the right side, and 2+ on the left side  Posterior tibial pulses are 2+ on the right side, and 2+ on the left side  Pulmonary/Chest: Effort normal and breath sounds normal  No respiratory distress  She has no wheezes   She has no rales  Abdominal: Soft  Bowel sounds are normal  She exhibits no distension  There is no tenderness  There is no rebound and no guarding  Musculoskeletal: Normal range of motion  She exhibits no edema  Lymphadenopathy:        Head (right side): No submental and no submandibular adenopathy present  Head (left side): No submental and no submandibular adenopathy present  She has no cervical adenopathy  Right cervical: No superficial cervical, no deep cervical and no posterior cervical adenopathy present  Left cervical: No superficial cervical, no deep cervical and no posterior cervical adenopathy present  Neurological: She is alert and oriented to person, place, and time  No cranial nerve deficit or sensory deficit  Skin: Skin is warm, dry and intact  Psychiatric: Her speech is normal and behavior is normal  Judgment normal  Her mood appears not anxious  Cognition and memory are normal  She does not exhibit a depressed mood  Vitals reviewed

## 2019-12-15 LAB — BACTERIA UR CULT: NORMAL

## 2020-03-31 ENCOUNTER — TELEMEDICINE (OUTPATIENT)
Dept: FAMILY MEDICINE CLINIC | Facility: CLINIC | Age: 60
End: 2020-03-31
Payer: COMMERCIAL

## 2020-03-31 DIAGNOSIS — J31.0 CHRONIC RHINITIS: Primary | ICD-10-CM

## 2020-03-31 PROCEDURE — 99212 OFFICE O/P EST SF 10 MIN: CPT | Performed by: FAMILY MEDICINE

## 2020-03-31 NOTE — PROGRESS NOTES
Virtual Brief Visit    Problem List Items Addressed This Visit     None                Reason for visit is cough    Encounter provider Xavier Ayala DO    Provider located at [place] [address]  [address] [facility] [address]  [phone]      Recent Visits  No visits were found meeting these conditions  Showing recent visits within past 7 days and meeting all other requirements     Future Appointments  No visits were found meeting these conditions  Showing future appointments within next 150 days and meeting all other requirements        After connecting through telephone, the patient was identified by name and date of birth  Star Babb was informed that this is a telemedicine visit and that the visit is being conducted through telephone  My office door was closed  No one else was in the room  She acknowledged consent and understanding of privacy and security of the video platform  The patient has agreed to participate and understands they can discontinue the visit at any time  Patient is aware this is a billable service  Subjective  Star Babb is a 62 y o  female  Presents today via telephone for her virtual visit  She has been having a cough for the past few weeks  She also has been having increasing sinus congestion  She denies any sick contacts  She denies any fevers chills shortness of breath  She states that she does frequently have chronic rhinitis    She has been seeing an allergist/  Otolaryngologist multiple times in the past       Past Medical History:   Diagnosis Date    Allergic rhinitis     LAST ASSESSED [id number]    Arthritis     osteo    Chest pain     LAST ASSESSED [id number]    Dysuria     LAST ASSESSED [id number]    Esophageal reflux     LAST ASSESSED [id number]    PMB (postmenopausal bleeding)     RESOLVED [id number]    PONV (postoperative nausea and vomiting)     Recurrent cold sores     LAST ASSESSED [id number] Past Surgical History:   Procedure Laterality Date    CHOLESTEATOMA EXCISION  03/2018    COLONOSCOPY  2009    COLONOSCOPY      CRANIOTOMY  09/2018    LYMPHADENECTOMY      Left Axilla    [facility] COLONOSCOPY FLX DX W/COLLJ SPEC WHEN PFRMD N/A 12/7/2018    Procedure: COLONOSCOPY;  Surgeon: Azell Severance, MD;  Location: [facility]; Service: Gastroenterology    TUBAL LIGATION         Current Outpatient Medications   Medication Sig Dispense Refill    acyclovir (ZOVIRAX) 5 % cream Zovirax 5 % topical cream      azelastine (ASTELIN) 0 1 % nasal spray 1 spray into each nostril 2 (two) times a day for 180 days Use in each nostril as directed 1 Bottle 5    Calcium Carb-Cholecalciferol (CALCIUM 500 +D PO) Take 1 tablet by mouth daily      cimetidine (TAGAMET) 200 mg tablet Take 200 mg by mouth 4 (four) times a day      EPINEPHrine (EPIPEN 2-HERBERT) 0 3 mg/0 3 mL SOAJ Inject into a muscle as needed      Ergocalciferol (VITAMIN D2 PO) Take 1 tablet by mouth daily        Esomeprazole Magnesium (NEXIUM PO) Take 1 tablet by mouth daily as needed      gabapentin (NEURONTIN) 100 mg capsule Take 100 mg by mouth daily      methylprednisolone (MEDROL) 4 mg tablet 6, 6, 5, 5, 4, 4, 3, 3, 2, 2,1,1 (Patient not taking: Reported on 7/11/2019) 42 tablet 1    multivitamin (THERAGRAN) TABS Take 1 tablet by mouth daily      olopatadine (PATANOL) 0 1 % ophthalmic solution       phenazopyridine (PYRIDIUM) 200 mg tablet Take 1 tablet (200 mg total) by mouth 3 (three) times a day with meals 10 tablet 0    Tuberculin-Allergy Syringes (B-D ALLERGY SYRINGE 1CC/28G) 28G X 1/2" 1 ML MISC every 14 (fourteen) days      valACYclovir (VALTREX) 1,000 mg tablet Take 1 tablet by mouth as needed       zolpidem (AMBIEN) 5 mg tablet Take 1 tablet (5 mg total) by mouth daily at bedtime as needed for sleep (Patient not taking: Reported on 12/13/2019) 30 tablet 5     No current facility-administered medications for this visit           Allergies Allergen Reactions    Sulfa Antibiotics Swelling and Rash    Sulfasalazine Swelling    Itraconazole Rash       Review of Systems   Constitutional: Negative for activity change, chills, fatigue and fever  HENT: Positive for congestion, postnasal drip, rhinorrhea and sinus pressure  Negative for ear pain and sore throat  Eyes: Negative for redness, itching and visual disturbance  Respiratory: Positive for cough  Negative for shortness of breath  Cardiovascular: Negative for chest pain and palpitations  Gastrointestinal: Negative for abdominal pain, diarrhea and nausea  Endocrine: Negative for cold intolerance and heat intolerance  Genitourinary: Negative for dysuria, flank pain and frequency  Musculoskeletal: Negative for arthralgias, back pain, gait problem and myalgias  Skin: Negative for color change  Allergic/Immunologic: Negative for environmental allergies  Neurological: Negative for dizziness, numbness and headaches  Psychiatric/Behavioral: Negative for behavioral problems and sleep disturbance  Assessment/Plan:  1  Chronic rhinitis   reviewed patient's symptoms today  At this time, symptoms appear likely secondary to her chronic rhinitis  She denies any fevers or chills  At this time, she was advised today that she is low risk for the need for testing  Will continue with routine monitoring  If any symptoms should worsen, she was advised to call  She was advised on importance of maintaining proper hydration  She may benefit from restarting her as a lasting nasal spray  Continue with her Nasonex nasal spray as well  If any symptoms should worsen, she was advised to call or follow up  I spent 6 minutes with the patient during this visit

## 2020-05-22 DIAGNOSIS — Z12.31 ENCOUNTER FOR SCREENING MAMMOGRAM FOR MALIGNANT NEOPLASM OF BREAST: ICD-10-CM

## 2020-07-16 ENCOUNTER — TELEPHONE (OUTPATIENT)
Dept: FAMILY MEDICINE CLINIC | Facility: CLINIC | Age: 60
End: 2020-07-16

## 2020-07-16 DIAGNOSIS — Z13.0 SCREENING FOR IRON DEFICIENCY ANEMIA: ICD-10-CM

## 2020-07-16 DIAGNOSIS — G47.09 OTHER INSOMNIA: ICD-10-CM

## 2020-07-16 DIAGNOSIS — Z13.220 SCREENING FOR CHOLESTEROL LEVEL: Primary | ICD-10-CM

## 2020-07-16 DIAGNOSIS — Q01.8 TEMPORAL ENCEPHALOCELE (HCC): ICD-10-CM

## 2020-07-16 DIAGNOSIS — K21.9 GASTROESOPHAGEAL REFLUX DISEASE WITHOUT ESOPHAGITIS: ICD-10-CM

## 2020-07-16 DIAGNOSIS — Z13.29 SCREENING FOR THYROID DISORDER: ICD-10-CM

## 2020-07-16 DIAGNOSIS — Z13.1 SCREENING FOR DIABETES MELLITUS: ICD-10-CM

## 2020-07-16 NOTE — TELEPHONE ENCOUNTER
Please place bw orders for pt upcoming physical in August with you  Bw orders for [facility]         Notify pt once orders placed

## 2020-07-17 DIAGNOSIS — Z13.220 SCREENING FOR CHOLESTEROL LEVEL: Primary | ICD-10-CM

## 2020-07-17 DIAGNOSIS — Z13.1 SCREENING FOR DIABETES MELLITUS: ICD-10-CM

## 2020-07-17 DIAGNOSIS — Z13.0 SCREENING FOR IRON DEFICIENCY ANEMIA: ICD-10-CM

## 2020-07-17 DIAGNOSIS — Z13.29 SCREENING FOR THYROID DISORDER: ICD-10-CM

## 2020-07-17 NOTE — TELEPHONE ENCOUNTER
Orders placed, please mail to patient  Please have patient also check with her insurance to see if these tests will be covered    Thank you

## 2020-08-12 LAB — HBA1C MFR BLD HPLC: 5.5 %

## 2020-08-24 ENCOUNTER — OFFICE VISIT (OUTPATIENT)
Dept: FAMILY MEDICINE CLINIC | Facility: CLINIC | Age: 60
End: 2020-08-24
Payer: COMMERCIAL

## 2020-08-24 VITALS
WEIGHT: 149.6 LBS | SYSTOLIC BLOOD PRESSURE: 122 MMHG | HEIGHT: 66 IN | RESPIRATION RATE: 16 BRPM | BODY MASS INDEX: 24.04 KG/M2 | DIASTOLIC BLOOD PRESSURE: 82 MMHG | OXYGEN SATURATION: 98 % | HEART RATE: 81 BPM | TEMPERATURE: 97.3 F

## 2020-08-24 DIAGNOSIS — E61.1 IRON DEFICIENCY: ICD-10-CM

## 2020-08-24 DIAGNOSIS — Z00.00 ANNUAL PHYSICAL EXAM: Primary | ICD-10-CM

## 2020-08-24 DIAGNOSIS — D72.819 LEUKOPENIA, UNSPECIFIED TYPE: ICD-10-CM

## 2020-08-24 PROCEDURE — 3725F SCREEN DEPRESSION PERFORMED: CPT | Performed by: FAMILY MEDICINE

## 2020-08-24 PROCEDURE — 99396 PREV VISIT EST AGE 40-64: CPT | Performed by: FAMILY MEDICINE

## 2020-08-24 PROCEDURE — 3008F BODY MASS INDEX DOCD: CPT | Performed by: FAMILY MEDICINE

## 2020-08-24 PROCEDURE — 1036F TOBACCO NON-USER: CPT | Performed by: FAMILY MEDICINE

## 2020-08-24 NOTE — PROGRESS NOTES
[facility]    NAME: Alex Sanderson  AGE: 56 y o  SEX: female  : 1960     DATE: 2020     Assessment and Plan:     Problem List Items Addressed This Visit     None      Visit Diagnoses     Annual physical exam    -  Primary          Immunizations and preventive care screenings were discussed with patient today  Appropriate education was printed on patient's after visit summary  Counseling:  Alcohol/drug use: discussed moderation in alcohol intake, the recommendations for healthy alcohol use, and avoidance of illicit drug use  Dental Health: discussed importance of regular tooth brushing, flossing, and dental visits  · Exercise: the importance of regular exercise/physical activity was discussed  Recommend exercise 3-5 times per week for at least 30 minutes  Return in 1 year (on 2021)  Chief Complaint:     Chief Complaint   Patient presents with    Physical Exam     BW REVIEW       History of Present Illness:     Adult Annual Physical   Patient here for a comprehensive physical exam  The patient reports problems - INTERMITTENT MIGRAINES  Diet and Physical Activity  · Diet/Nutrition: well balanced diet  · Exercise: walking and 5-7 times a week on average  Depression Screening  PHQ-9 Depression Screening    PHQ-9:    Frequency of the following problems over the past two weeks:       Little interest or pleasure in doing things:  0 - not at all  Feeling down, depressed, or hopeless:  0 - not at all  PHQ-2 Score:  0       General Health  · Sleep: sleeps well  · Hearing: normal - bilateral   · Vision: no vision problems  · Dental: regular dental visits  /GYN Health  · Patient is: postmenopausal     Review of Systems:     Review of Systems   Constitutional: Negative for activity change, chills, fatigue and fever  HENT: Negative for congestion, ear pain, sinus pressure and sore throat  Eyes: Negative for redness, itching and visual disturbance  Respiratory: Negative for cough and shortness of breath  Cardiovascular: Negative for chest pain and palpitations  Gastrointestinal: Negative for abdominal pain, diarrhea and nausea  Endocrine: Negative for cold intolerance and heat intolerance  Genitourinary: Negative for dysuria, flank pain and frequency  Musculoskeletal: Negative for arthralgias, back pain, gait problem and myalgias  Skin: Negative for color change  Allergic/Immunologic: Negative for environmental allergies  Neurological: Negative for dizziness, numbness and headaches  Psychiatric/Behavioral: Negative for behavioral problems and sleep disturbance  Past Medical History:     Past Medical History:   Diagnosis Date    Allergic rhinitis     LAST ASSESSED [id number]    Arthritis     osteo    Chest pain     LAST ASSESSED [id number]    Dysuria     LAST ASSESSED 11NOV2016    Esophageal reflux     LAST ASSESSED [id number]    PMB (postmenopausal bleeding)     RESOLVED 18OCT2016    PONV (postoperative nausea and vomiting)     Recurrent cold sores     LAST ASSESSED [id number]      Past Surgical History:     Past Surgical History:   Procedure Laterality Date    CHOLESTEATOMA EXCISION  03/2018    COLONOSCOPY  2009    COLONOSCOPY      CRANIOTOMY  09/2018    LYMPHADENECTOMY      Left Axilla    LA COLONOSCOPY FLX DX W/COLLJ SPEC WHEN PFRMD N/A 12/7/2018    Procedure: COLONOSCOPY;  Surgeon: Lupillo Cabrera MD;  Location: [facility];   Service: Gastroenterology    TUBAL LIGATION        Social History:        Social History     Socioeconomic History    Marital status: /Civil Union     Spouse name: None    Number of children: None    Years of education: None    Highest education level: None   Occupational History    None   Social Needs    Financial resource strain: None    Food insecurity     Worry: None     Inability: None    Transportation needs Medical: None     Non-medical: None   Tobacco Use    Smoking status: Former Smoker    Smokeless tobacco: Never Used   Substance and Sexual Activity    Alcohol use: Yes     Comment: SOCIAL     Drug use: No    Sexual activity: Yes     Partners: Male     Birth control/protection: Female Sterilization, Post-menopausal   Lifestyle    Physical activity     Days per week: None     Minutes per session: None    Stress: None   Relationships    Social connections     Talks on phone: None     Gets together: None     Attends Rastafari service: None     Active member of club or organization: None     Attends meetings of clubs or organizations: None     Relationship status: None    Intimate partner violence     Fear of current or ex partner: None     Emotionally abused: None     Physically abused: None     Forced sexual activity: None   Other Topics Concern    None   Social History Narrative    None      Family History:     Family History   Problem Relation Age of Onset    Diabetes Mother     Hypertension Mother     Stroke Mother     Arthritis Father     Hearing loss Father     Alcohol abuse Neg Hx     Substance Abuse Neg Hx     Mental illness Neg Hx     Depression Neg Hx       Current Medications:     Current Outpatient Medications   Medication Sig Dispense Refill    acyclovir (ZOVIRAX) 5 % cream Zovirax 5 % topical cream      Calcium Carb-Cholecalciferol (CALCIUM 500 +D PO) Take 1 tablet by mouth daily      cimetidine (TAGAMET) 200 mg tablet Take 200 mg by mouth 4 (four) times a day      EPINEPHrine (EPIPEN 2-HERBERT) 0 3 mg/0 3 mL SOAJ Inject into a muscle as needed      Ergocalciferol (VITAMIN D2 PO) Take 1 tablet by mouth daily        Esomeprazole Magnesium (NEXIUM PO) Take 1 tablet by mouth daily as needed      multivitamin (THERAGRAN) TABS Take 1 tablet by mouth daily      olopatadine (PATANOL) 0 1 % ophthalmic solution       prednisoLONE acetate (PRED FORTE) 1 % ophthalmic suspension 2 drops affected ear qhs prn ithcing 5 mL 3    Tuberculin-Allergy Syringes (B-D ALLERGY SYRINGE 1CC/28G) 28G X 1/2" 1 ML MISC every 14 (fourteen) days      valACYclovir (VALTREX) 1,000 mg tablet Take 1 tablet by mouth as needed       zolpidem (AMBIEN) 5 mg tablet Take 1 tablet (5 mg total) by mouth daily at bedtime as needed for sleep 30 tablet 5    azelastine (ASTELIN) 0 1 % nasal spray 1 spray into each nostril 2 (two) times a day for 180 days Use in each nostril as directed 1 Bottle 5     No current facility-administered medications for this visit  Allergies: Allergies   Allergen Reactions    Sulfa Antibiotics Swelling and Rash    Sulfasalazine Swelling    Itraconazole Rash      Physical Exam:     /82 (BP Location: Right arm, Patient Position: Sitting, Cuff Size: Adult)   Pulse 81   Temp (!) 97 3 °F (36 3 °C) (Temporal)   Resp 16   Ht 5' 6" (1 676 m)   Wt 67 9 kg (149 lb 9 6 oz)   SpO2 98%   BMI 24 15 kg/m²     Physical Exam  Vitals signs reviewed  Constitutional:       General: She is not in acute distress  Appearance: She is well-developed  She is not diaphoretic  HENT:      Head: Normocephalic and atraumatic  Right Ear: External ear normal       Left Ear: External ear normal       Nose: Nose normal    Eyes:      General:         Right eye: No discharge  Left eye: No discharge  Pupils: Pupils are equal, round, and reactive to light  Neck:      Musculoskeletal: Normal range of motion and neck supple  Cardiovascular:      Rate and Rhythm: Normal rate and regular rhythm  Heart sounds: Normal heart sounds  No murmur  No friction rub  No gallop  Pulmonary:      Effort: Pulmonary effort is normal  No respiratory distress  Breath sounds: Normal breath sounds  No wheezing or rales  Abdominal:      General: Bowel sounds are normal  There is no distension  Palpations: Abdomen is soft  Tenderness: There is no abdominal tenderness   There is no guarding  Musculoskeletal: Normal range of motion  Lymphadenopathy:      Cervical: No cervical adenopathy  Skin:     General: Skin is warm and dry  Capillary Refill: Capillary refill takes less than 2 seconds  Findings: No erythema or rash  Neurological:      Mental Status: She is alert and oriented to person, place, and time  Cranial Nerves: No cranial nerve deficit  Psychiatric:         Behavior: Behavior normal          Thought Content:  Thought content normal          Judgment: Judgment normal           DO EUNICE Lindo [facility]

## 2020-08-24 NOTE — PATIENT INSTRUCTIONS

## 2020-10-26 ENCOUNTER — IMMUNIZATIONS (OUTPATIENT)
Dept: FAMILY MEDICINE CLINIC | Facility: CLINIC | Age: 60
End: 2020-10-26
Payer: COMMERCIAL

## 2020-10-26 DIAGNOSIS — Z23 NEED FOR VACCINATION: Primary | ICD-10-CM

## 2020-10-26 PROCEDURE — 90682 RIV4 VACC RECOMBINANT DNA IM: CPT | Performed by: FAMILY MEDICINE

## 2020-10-26 PROCEDURE — 90471 IMMUNIZATION ADMIN: CPT | Performed by: FAMILY MEDICINE

## 2020-10-29 ENCOUNTER — OFFICE VISIT (OUTPATIENT)
Dept: FAMILY MEDICINE CLINIC | Facility: CLINIC | Age: 60
End: 2020-10-29
Payer: COMMERCIAL

## 2020-10-29 ENCOUNTER — TELEPHONE (OUTPATIENT)
Dept: FAMILY MEDICINE CLINIC | Facility: CLINIC | Age: 60
End: 2020-10-29

## 2020-10-29 VITALS
BODY MASS INDEX: 24.23 KG/M2 | TEMPERATURE: 97.1 F | HEIGHT: 66 IN | HEART RATE: 60 BPM | SYSTOLIC BLOOD PRESSURE: 114 MMHG | WEIGHT: 150.8 LBS | DIASTOLIC BLOOD PRESSURE: 70 MMHG | OXYGEN SATURATION: 99 % | RESPIRATION RATE: 17 BRPM

## 2020-10-29 DIAGNOSIS — R10.32 LEFT LOWER QUADRANT ABDOMINAL PAIN: Primary | ICD-10-CM

## 2020-10-29 PROCEDURE — 3008F BODY MASS INDEX DOCD: CPT | Performed by: FAMILY MEDICINE

## 2020-10-29 PROCEDURE — 99214 OFFICE O/P EST MOD 30 MIN: CPT | Performed by: FAMILY MEDICINE

## 2020-11-11 ENCOUNTER — HOSPITAL ENCOUNTER (OUTPATIENT)
Dept: CT IMAGING | Facility: HOSPITAL | Age: 60
Discharge: HOME | End: 2020-11-11
Payer: COMMERCIAL

## 2020-11-11 DIAGNOSIS — R10.32 LEFT LOWER QUADRANT ABDOMINAL PAIN: ICD-10-CM

## 2020-11-11 PROCEDURE — 74177 CT ABD & PELVIS W/CONTRAST: CPT

## 2020-11-11 PROCEDURE — G1004 CDSM NDSC: HCPCS

## 2020-11-11 RX ADMIN — IOHEXOL 100 ML: 350 INJECTION, SOLUTION INTRAVENOUS at 17:07

## 2020-11-17 ENCOUNTER — ANNUAL EXAM (OUTPATIENT)
Dept: OBGYN CLINIC | Facility: CLINIC | Age: 60
End: 2020-11-17
Payer: COMMERCIAL

## 2020-11-17 VITALS — WEIGHT: 150 LBS | SYSTOLIC BLOOD PRESSURE: 132 MMHG | BODY MASS INDEX: 24.21 KG/M2 | DIASTOLIC BLOOD PRESSURE: 70 MMHG

## 2020-11-17 DIAGNOSIS — R10.32 LEFT LOWER QUADRANT ABDOMINAL PAIN: Primary | ICD-10-CM

## 2020-11-17 DIAGNOSIS — Z01.419 ENCOUNTER FOR WELL WOMAN EXAM WITH ROUTINE GYNECOLOGICAL EXAM: Primary | ICD-10-CM

## 2020-11-17 DIAGNOSIS — G47.00 INSOMNIA, UNSPECIFIED TYPE: ICD-10-CM

## 2020-11-17 DIAGNOSIS — Z12.31 ENCOUNTER FOR SCREENING MAMMOGRAM FOR MALIGNANT NEOPLASM OF BREAST: ICD-10-CM

## 2020-11-17 PROCEDURE — S0612 ANNUAL GYNECOLOGICAL EXAMINA: HCPCS | Performed by: OBSTETRICS & GYNECOLOGY

## 2020-11-17 RX ORDER — ZOLPIDEM TARTRATE 5 MG/1
5 TABLET ORAL
Qty: 30 TABLET | Refills: 3 | Status: SHIPPED | OUTPATIENT
Start: 2020-11-17

## 2021-01-05 ENCOUNTER — TELEMEDICINE (OUTPATIENT)
Dept: FAMILY MEDICINE CLINIC | Facility: CLINIC | Age: 61
End: 2021-01-05
Payer: COMMERCIAL

## 2021-01-05 DIAGNOSIS — B34.9 VIRAL INFECTION, UNSPECIFIED: ICD-10-CM

## 2021-01-05 DIAGNOSIS — B34.9 VIRAL INFECTION, UNSPECIFIED: Primary | ICD-10-CM

## 2021-01-05 PROCEDURE — 99213 OFFICE O/P EST LOW 20 MIN: CPT | Performed by: FAMILY MEDICINE

## 2021-01-05 PROCEDURE — 1036F TOBACCO NON-USER: CPT | Performed by: FAMILY MEDICINE

## 2021-01-05 PROCEDURE — U0003 INFECTIOUS AGENT DETECTION BY NUCLEIC ACID (DNA OR RNA); SEVERE ACUTE RESPIRATORY SYNDROME CORONAVIRUS 2 (SARS-COV-2) (CORONAVIRUS DISEASE [COVID-19]), AMPLIFIED PROBE TECHNIQUE, MAKING USE OF HIGH THROUGHPUT TECHNOLOGIES AS DESCRIBED BY CMS-2020-01-R: HCPCS | Performed by: FAMILY MEDICINE

## 2021-01-05 RX ORDER — AZITHROMYCIN 250 MG/1
TABLET, FILM COATED ORAL
Qty: 6 TABLET | Refills: 0 | Status: SHIPPED | OUTPATIENT
Start: 2021-01-05 | End: 2021-01-10

## 2021-01-05 NOTE — ASSESSMENT & PLAN NOTE
Rule out COVID-19  Increase fluid intake and get plenty of rest       Please start Azithromycin which was sent to your pharmacy  If you have nasal congestions, post nasal drip, sinus pressure, runny nose you may try the following:        Clearing your sinuses in a nice steamy shower or in bathroom filled with steamy air  Nasal saline rinses every 1-2 hours while awake may also help decrease nasal congestion, drainage  You may try Mucinex D 12 hour version  1/2 to 1 tablet one to two times a day as needed for nasal congestion, runny nose, post nasal drip, or cough  If you have sore / scratch / irritated throat, you may try the following:          Warm salt water gargles every 1-2 hours while awake, throat lozenges, Tylenol and/or ibuprofen  Please note that a cough is not necessarily a bad thing  It is the body's way of protecting the airways  If a cough is keeping you from sleeping at night, you may use cough suppressant such as Delsym Cough Syrup, NyQuil, Robitussin DM  Most upper respiratory symptoms start to improve after 7-10 days but may take a few weeks to completely resolve  You may also use Ibuprofen or Acetaminophen containing product for symptom relief  Follow up in 1 week if your symptoms persist or worsen  Please call the office if you have any questions  The patient verbalized understanding of treatment plan

## 2021-01-05 NOTE — PROGRESS NOTES
COVID-19 Virtual Visit     Assessment/Plan:    Problem List Items Addressed This Visit        Other    Viral infection, unspecified - Primary     Rule out COVID-19  Increase fluid intake and get plenty of rest       Please start Azithromycin which was sent to your pharmacy  If you have nasal congestions, post nasal drip, sinus pressure, runny nose you may try the following:        Clearing your sinuses in a nice steamy shower or in bathroom filled with steamy air  Nasal saline rinses every 1-2 hours while awake may also help decrease nasal congestion, drainage  You may try Mucinex D 12 hour version  1/2 to 1 tablet one to two times a day as needed for nasal congestion, runny nose, post nasal drip, or cough  If you have sore / scratch / irritated throat, you may try the following:          Warm salt water gargles every 1-2 hours while awake, throat lozenges, Tylenol and/or ibuprofen  Please note that a cough is not necessarily a bad thing  It is the body's way of protecting the airways  If a cough is keeping you from sleeping at night, you may use cough suppressant such as Delsym Cough Syrup, NyQuil, Robitussin DM  Most upper respiratory symptoms start to improve after 7-10 days but may take a few weeks to completely resolve  You may also use Ibuprofen or Acetaminophen containing product for symptom relief  Follow up in 1 week if your symptoms persist or worsen  Please call the office if you have any questions  The patient verbalized understanding of treatment plan  Relevant Medications    azithromycin (ZITHROMAX) 250 mg tablet    Other Relevant Orders    Novel Coronavirus (COVID-19), PCR [facility] - Collected at   [facility] or [facility]         Disposition:     I referred patient to one of our centralized sites for a COVID-19 swab  I have spent 10 minutes directly with the patient   Greater than 50% of this time was spent in counseling/coordination of care regarding: prognosis, risks and benefits of treatment options, instructions for management, patient and family education, importance of treatment compliance, risk factor reductions and impressions  Encounter provider Anna Arthur DO    Provider located at [place] [address]  [address] [facility] [address] [facility] [address]  [phone]    Recent Visits  No visits were found meeting these conditions  Showing recent visits within past 7 days and meeting all other requirements     Today's Visits  Date Type Provider Dept   01/05/21 Telemedicine Anna Arthur DO [address] [id number] [address] today's visits and meeting all other requirements     Future Appointments  No visits were found meeting these conditions  Showing future appointments within next 150 days and meeting all other requirements      This virtual check-in was done via [employer] and patient was informed that this is a secure, HIPAA-compliant platform  She agrees to proceed  Patient agrees to participate in a virtual check in via telephone or video visit instead of presenting to the office to address urgent/immediate medical needs  Patient is aware this is a billable service  After connecting through [facility], the patient was identified by name and date of birth  Griselda Skeen was informed that this was a telemedicine visit and that the exam was being conducted confidentially over secure lines  My office door was closed  No one else was in the room  Griselda Skeen acknowledged consent and understanding of privacy and security of the telemedicine visit  I informed the patient that I have reviewed her record in Epic and presented the opportunity for her to ask any questions regarding the visit today  The patient agreed to participate  Subjective:   Griselda Skeen is a 56 y o  female who is concerned about COVID-19       Date of symptom onset: 12/24/2020    Patient's symptoms include nasal congestion, rhinorrhea, sore throat, cough and headache  Patient denies fever, chills, anosmia, loss of taste, shortness of breath, chest tightness, abdominal pain, nausea, vomiting, diarrhea and myalgias  Exposure:   Contact with a person who is under investigation (PUI) for or who is positive for COVID-19 within the last 14 days?: No    Hospitalized recently for fever and/or lower respiratory symptoms?: No      Currently a healthcare worker that is involved in direct patient care?: No      Works in a special setting where the risk of COVID-19 transmission may be high? (this may include long-term care, correctional and shelter facilities; homeless shelters; assisted-living facilities and group homes ): No      Resident in a special setting where the risk of COVID-19 transmission may be high? (this may include long-term care, correctional and shelter facilities; homeless shelters; assisted-living facilities and group homes ): No      Patient has had a cough and tickle in her throat for the past 2 weeks  Her symptoms started right before Deputy  She is taking Mucinex for about a week but it's not quite helping  She tried Allegra this morning for congestion  She is able to take a deep breath without difficulty  She developed headache and dizziness 3 days ago  She takes Tylenol for relief  She and her  have been home and don't go out  No known sick contacts       No results found for: Shorty Primrose, SARSCORONAVI, [address]  Past Medical History:   Diagnosis Date    Allergic rhinitis     LAST ASSESSED [id number]    Arthritis     osteo    Chest pain     LAST ASSESSED [id number]    Dysuria     LAST ASSESSED 11NOV2016    Esophageal reflux     LAST ASSESSED [id number]    PMB (postmenopausal bleeding)     RESOLVED [id number]    PONV (postoperative nausea and vomiting)     Recurrent cold sores     LAST ASSESSED [id number]     Past Surgical History:   Procedure Laterality Date    CHOLESTEATOMA EXCISION  03/2018    COLONOSCOPY  2009    COLONOSCOPY      CRANIOTOMY  09/2018    LYMPHADENECTOMY      Left Axilla    DE COLONOSCOPY FLX DX W/COLLJ SPEC WHEN PFRMD N/A 12/7/2018    Procedure: COLONOSCOPY;  Surgeon: Tae Tam MD;  Location: [facility]; Service: Gastroenterology    TUBAL LIGATION       Current Outpatient Medications   Medication Sig Dispense Refill    acyclovir (ZOVIRAX) 5 % cream Zovirax 5 % topical cream      azelastine (ASTELIN) 0 1 % nasal spray 1 spray into each nostril 2 (two) times a day for 180 days Use in each nostril as directed 1 Bottle 5    azithromycin (ZITHROMAX) 250 mg tablet Take 2 tablets (500 mg total) by mouth every 24 hours for 1 day, THEN 1 tablet (250 mg total) every 24 hours for 4 days  6 tablet 0    Calcium Carb-Cholecalciferol (CALCIUM 500 +D PO) Take 1 tablet by mouth daily      EPINEPHrine (EpiPen 2-Germain) 0 3 mg/0 3 mL SOAJ Inject 0 3 mL (0 3 mg total) into a muscle as needed for anaphylaxis 1 each 2    Ergocalciferol (VITAMIN D2 PO) Take 1 tablet by mouth daily        Esomeprazole Magnesium (NEXIUM PO) Take 1 tablet by mouth daily as needed      Licorice, Glycyrrhiza glabra, (LICORICE ROOT PO) Take by mouth      multivitamin (THERAGRAN) TABS Take 1 tablet by mouth daily      olopatadine (PATANOL) 0 1 % ophthalmic solution       prednisoLONE acetate (PRED FORTE) 1 % ophthalmic suspension 2 drops affected ear qhs prn ithcing 5 mL 3    Tuberculin-Allergy Syringes (B-D ALLERGY SYRINGE 1CC/28G) 28G X 1/2" 1 ML MISC every 14 (fourteen) days      valACYclovir (VALTREX) 1,000 mg tablet Take 1 tablet by mouth as needed       zolpidem (AMBIEN) 5 mg tablet Take 1 tablet (5 mg total) by mouth daily at bedtime as needed for sleep 30 tablet 3     No current facility-administered medications for this visit        Allergies   Allergen Reactions    Sulfa Antibiotics Swelling and Rash    Sulfasalazine Swelling    Itraconazole Rash       Review of Systems Constitutional: Negative for chills and fever  HENT: Positive for congestion, rhinorrhea and sore throat  Respiratory: Positive for cough  Negative for chest tightness and shortness of breath  Gastrointestinal: Negative for abdominal pain, diarrhea, nausea and vomiting  Musculoskeletal: Negative for myalgias  Neurological: Positive for headaches  Objective: There were no vitals filed for this visit  Physical Exam  Constitutional:       Appearance: Normal appearance  HENT:      Head: Normocephalic and atraumatic  Pulmonary:      Effort: Pulmonary effort is normal  No respiratory distress  Neurological:      General: No focal deficit present  Mental Status: She is alert and oriented to person, place, and time  Psychiatric:         Mood and Affect: Mood normal          Behavior: Behavior normal          Thought Content: Thought content normal          Judgment: Judgment normal        VIRTUAL VISIT DISCLAIMER    Edi Mari acknowledges that she has consented to an online visit or consultation  She understands that the online visit is based solely on information provided by her, and that, in the absence of a face-to-face physical evaluation by the physician, the diagnosis she receives is both limited and provisional in terms of accuracy and completeness  This is not intended to replace a full medical face-to-face evaluation by the physician  Edi Mari understands and accepts these terms

## 2021-01-06 LAB — SARS-COV-2 RNA SPEC QL NAA+PROBE: NOT DETECTED

## 2021-04-05 ENCOUNTER — VBI (OUTPATIENT)
Dept: ADMINISTRATIVE | Facility: OTHER | Age: 61
End: 2021-04-05

## 2021-04-07 ENCOUNTER — TELEPHONE (OUTPATIENT)
Dept: FAMILY MEDICINE CLINIC | Facility: CLINIC | Age: 61
End: 2021-04-07

## 2021-04-19 NOTE — TELEPHONE ENCOUNTER
04/19/21 3:02 PM     Thank you for your request  Your request has been received, reviewed, and the patient chart updated  The PCP has successfully been removed with a patient attribution note  This message will now be completed      Thank you  Drea Rosales

## 2021-07-26 ENCOUNTER — VBI (OUTPATIENT)
Dept: ADMINISTRATIVE | Facility: OTHER | Age: 61
End: 2021-07-26

## 2021-09-28 ENCOUNTER — VBI (OUTPATIENT)
Dept: ADMINISTRATIVE | Facility: OTHER | Age: 61
End: 2021-09-28

## 2021-12-14 ENCOUNTER — VBI (OUTPATIENT)
Dept: ADMINISTRATIVE | Facility: OTHER | Age: 61
End: 2021-12-14

## 2021-12-21 PROBLEM — H90.0 CONDUCTIVE HEARING LOSS, BILATERAL: Status: ACTIVE | Noted: 2021-12-21

## 2022-01-20 NOTE — ANESTHESIA POSTPROCEDURE EVALUATION
Post-Op Assessment Note      CV Status:  Stable    Mental Status:  Alert and awake    Hydration Status:  Euvolemic    PONV Controlled:  Controlled    Airway Patency:  Patent    Post Op Vitals Reviewed: Yes          Staff: Anesthesiologist, CRNA           /62 (12/07/18 0810)    Temp      Pulse 73 (12/07/18 0810)   Resp 16 (12/07/18 0810)    SpO2 100 % (12/07/18 0810) Pushpa Cai a  from [facility] [address] called wanting to know if anything was needed for Zara Samuel at this point that she could help with. I informed her not at the [facility].  Pushpa Cai left her number with ext to reach her if need be, [phone] EXT [phone]

## 2023-11-07 NOTE — PROGRESS NOTES
Assessment  1  Acute sinusitis (461 9) (J01 90)    Plan  Acute sinusitis    · Amoxicillin-Pot Clavulanate 875-125 MG Oral Tablet; TAKE 1 TABLET EVERY 12HOURS UNTIL GONE   · Medrol 4 MG Oral Tablet Therapy Pack (MethylPREDNISolone); Take as directedon package insert    Discussion/Summary    15-year-old female here today for acute sinusitis  Patient to complete a 10 day course of Augmentin b i d  to be taken with food  She will continue Neti pot, azelastine and combination antihistamine-decongestant pill  I did provide her with a printed prescription for Medrol Dosepak to started her discretion in a few days if she sees no improvement with sinus pressure and ear pressure  She already follows with an ear nose and throat specialist for eustachian tube dysfunction and has tube placement in the right TM and made in consider eustachian tube surgery in the new year  Rest and fluids advised  Patient to call the office should symptoms persist or worsen despite treatment and was encouraged to start the steroid pack in a few days if she sees no improvement with the antibiotic  Possible side effects of new medications were reviewed with the patient/guardian today  The treatment plan was reviewed with the patient/guardian  The patient/guardian understands and agrees with the treatment plan      Chief Complaint  Pt c/o congestion, sore throat, post nasal drip, and ear pressure x 2 weeks  History of Present Illness  HPI: 57y/o female here today for sinus sxs past few weeks  Pt tried allegra D  Reports nasal congestion, sinus pressure, PND, ears clogged  allergy nasal spray and netti pot  no fevers  Review of Systems   Constitutional: as noted in HPI   ENT: as noted in HPI  Cardiovascular: no complaints of slow or fast heart rate, no chest pain, no palpitations, no leg claudication or lower extremity edema  Respiratory: no complaints of shortness of breath, no wheezing, no dyspnea on exertion, no orthopnea or PND    The patient presents with complaints of gradual onset of intermittent episodes of mild dizziness  Active Problems  1  Acute sinusitis (461 9) (J01 90)   2  Encounter for routine gynecological examination (V72 31) (Z01 419)   3  Encounter for screening mammogram for breast cancer (V76 12) (Z12 31)   4  Insomnia (780 52) (G47 00)    Past Medical History  1  History of Acute URI (465 9) (J06 9)   2  History of Axillary swelling (729 81) (M79 89)   3  History of Blood in stool (578 1) (K92 1)   4  History of Cervical cancer screening (V76 2) (Z12 4)   5  History of Chest pain (786 50) (R07 9)   6  History of Depression screen (V79 0) (Z13 89)   7  History of Dysuria (788 1) (R30 0)   8  History of Encounter for screening mammogram for malignant neoplasm of breast (V76 12) (Z12 31)   9  History of acute bacterial sinusitis (V12 69) (Z87 09)   10  History of acute bronchitis (V12 69) (Z87 09)   11  History of acute sinusitis (V12 69) (Z87 09)   12  History of allergic rhinitis (V12 69) (Z87 09)   13  History of bacterial conjunctivitis (V12 49) (Z86 69)   14  History of esophageal reflux (V12 79) (Z87 19)   15  History of Need for immunization against influenza (V04 81) (Z23)   16  History of Need for prophylactic vaccination with measles-mumps-rubella (MMR) vaccine  (V06 4) (Z23)   17  History of PMB (postmenopausal bleeding) (627 1) (N95 0)   18  History of Recurrent cold sores (054 9) (B00 1)   19  History of Routine Gynecological Exam With Cervical Pap Smear (V72 31)   20  History of Screening for cardiovascular condition (V81 2) (Z13 6)   21  History of Screening for deficiency anemia (V78 1) (Z13 0)   22  History of Screening for diabetes mellitus (V77 1) (Z13 1)   23  History of Screening for thyroid disorder (V77 0) (Z13 29)   24  History of Sensation of lump in throat (784 99) (R22 1)   25  History of Sore throat (462) (J02 9)   26  History of Urgency of urination (788 63) (R33 85)   27   History of Urinary frequency (788 41) (R35 0)   28  Urinary tract infection (599 0) (N39 0)    Family History  Mother    1  Family history of Diabetes   2  Family history of Hypertension    Social History   · Being A Social Drinker   · Never A Smoker  The social history was reviewed and updated today  Surgical History  1  History of Colonoscopy (Fiberoptic) Screening   2  History of Complete Colonoscopy   3  History of Lymphadenectomy   4  History of Tubal Ligation    Current Meds   1  Azelastine HCl - 0 1 % Nasal Solution; INSERT 2 SQUIRTS IN EACH NOSTRIL TWICE DAILY; Therapy: [id number] to (Last Rx:13Oct2016)  Requested for: 13Oct2016 Ordered   2  Zolpidem Tartrate 5 MG Oral Tablet; TAKE 1 TABLET AT  BEDTIME AS NEEDED FOR INSOMNIA; Therapy: 99Ntr9053 to (Evaluate:80Ygd2522); Last Rx:[id number] Ordered    The medication list was reviewed and updated today  Allergies  1  Sporanox CAPS   2  Sulfa Drugs    Vitals   Recorded: 34COV0807 06:51PM   Temperature 98 2 F, Tympanic   Heart Rate 76   Respiration Quality Normal   Respiration 17   Systolic 958, LUE, Sitting   Diastolic 76, LUE, Sitting   Weight 159 lb 2 oz   BMI Calculated 25 68   BSA Calculated 1 81   O2 Saturation 98, RA   Pain Scale 0     Physical Exam   Constitutional  General appearance: Abnormal   acutely ill,-- within normal limits of ideal weight-- and-- appearance reflects stated age  Ears, Nose, Mouth, and Throat  External inspection of ears and nose: Normal    Otoscopic examination: Abnormal  -- dull, retracted TM's tube placement right TM, no drainage  Nasal mucosa, septum, and turbinates: Abnormal   There was a mucoid discharge from both nares  The bilateral nasal mucosa was boggy,-- edematous-- and-- red  B/L swelling and erythema  Oropharynx: Abnormal  -- PND  Pulmonary  Respiratory effort: No increased work of breathing or signs of respiratory distress  Auscultation of lungs: Clear to auscultation     Cardiovascular  Auscultation of heart: Normal rate and rhythm, normal S1 and S2, without murmurs  Lymphatic  Palpation of lymph nodes in neck: No lymphadenopathy  Psychiatric  Orientation to person, place, and time: Normal    Mood and affect: Normal    Additional Exam:  vitals reviewed  Future Appointments    Date/Time Provider Specialty Site   12/18/2018 08:00 AM [facility] [address]     Signatures   Electronically signed by : Gutierrez Coleman [facility]; Dec 18 2017  7:17PM EST                       (Author)    Electronically signed by :  Lucy Gilbert DO; Dec 18 2017  7:17PM EST                       (Author) monthly or less